# Patient Record
Sex: FEMALE | Race: WHITE | NOT HISPANIC OR LATINO | Employment: UNEMPLOYED | URBAN - METROPOLITAN AREA
[De-identification: names, ages, dates, MRNs, and addresses within clinical notes are randomized per-mention and may not be internally consistent; named-entity substitution may affect disease eponyms.]

---

## 2021-01-14 ENCOUNTER — OFFICE VISIT (OUTPATIENT)
Dept: FAMILY MEDICINE CLINIC | Facility: OTHER | Age: 61
End: 2021-01-14
Payer: COMMERCIAL

## 2021-01-14 VITALS
SYSTOLIC BLOOD PRESSURE: 180 MMHG | OXYGEN SATURATION: 96 % | BODY MASS INDEX: 41.78 KG/M2 | WEIGHT: 260 LBS | RESPIRATION RATE: 22 BRPM | HEART RATE: 98 BPM | DIASTOLIC BLOOD PRESSURE: 110 MMHG | HEIGHT: 66 IN | TEMPERATURE: 98.2 F

## 2021-01-14 DIAGNOSIS — E03.9 HYPOTHYROIDISM, UNSPECIFIED TYPE: ICD-10-CM

## 2021-01-14 DIAGNOSIS — E78.5 HYPERLIPIDEMIA, UNSPECIFIED HYPERLIPIDEMIA TYPE: ICD-10-CM

## 2021-01-14 DIAGNOSIS — M51.26 LUMBAR DISC HERNIATION: ICD-10-CM

## 2021-01-14 DIAGNOSIS — K31.89 STOMACH SPASM: ICD-10-CM

## 2021-01-14 DIAGNOSIS — N18.30 STAGE 3 CHRONIC KIDNEY DISEASE, UNSPECIFIED WHETHER STAGE 3A OR 3B CKD (HCC): ICD-10-CM

## 2021-01-14 DIAGNOSIS — J30.9 ALLERGIC RHINITIS, UNSPECIFIED SEASONALITY, UNSPECIFIED TRIGGER: ICD-10-CM

## 2021-01-14 DIAGNOSIS — Z11.59 NEED FOR HEPATITIS C SCREENING TEST: ICD-10-CM

## 2021-01-14 DIAGNOSIS — J44.9 CHRONIC OBSTRUCTIVE PULMONARY DISEASE, UNSPECIFIED COPD TYPE (HCC): ICD-10-CM

## 2021-01-14 DIAGNOSIS — I10 ESSENTIAL HYPERTENSION: Primary | ICD-10-CM

## 2021-01-14 DIAGNOSIS — F32.A DEPRESSION, UNSPECIFIED DEPRESSION TYPE: ICD-10-CM

## 2021-01-14 DIAGNOSIS — N39.46 MIXED STRESS AND URGE INCONTINENCE: ICD-10-CM

## 2021-01-14 DIAGNOSIS — I51.7 CARDIOMEGALY: ICD-10-CM

## 2021-01-14 DIAGNOSIS — M50.20 CERVICAL DISC HERNIATION: ICD-10-CM

## 2021-01-14 DIAGNOSIS — Z12.31 ENCOUNTER FOR SCREENING MAMMOGRAM FOR BREAST CANCER: ICD-10-CM

## 2021-01-14 DIAGNOSIS — Z11.4 SCREENING FOR HIV (HUMAN IMMUNODEFICIENCY VIRUS): ICD-10-CM

## 2021-01-14 DIAGNOSIS — Z12.11 SCREEN FOR COLON CANCER: ICD-10-CM

## 2021-01-14 PROCEDURE — 4004F PT TOBACCO SCREEN RCVD TLK: CPT | Performed by: FAMILY MEDICINE

## 2021-01-14 PROCEDURE — 99204 OFFICE O/P NEW MOD 45 MIN: CPT | Performed by: FAMILY MEDICINE

## 2021-01-14 PROCEDURE — 3725F SCREEN DEPRESSION PERFORMED: CPT | Performed by: FAMILY MEDICINE

## 2021-01-14 PROCEDURE — 3080F DIAST BP >= 90 MM HG: CPT | Performed by: FAMILY MEDICINE

## 2021-01-14 PROCEDURE — 3008F BODY MASS INDEX DOCD: CPT | Performed by: FAMILY MEDICINE

## 2021-01-14 PROCEDURE — 3077F SYST BP >= 140 MM HG: CPT | Performed by: FAMILY MEDICINE

## 2021-01-14 RX ORDER — MELATONIN
1000 DAILY
Qty: 30 TABLET | Refills: 0 | Status: SHIPPED | OUTPATIENT
Start: 2021-01-14 | End: 2022-04-27

## 2021-01-14 RX ORDER — ACETAMINOPHEN AND CODEINE PHOSPHATE 300; 60 MG/1; MG/1
1 TABLET ORAL EVERY 4 HOURS PRN
COMMUNITY
End: 2021-01-14 | Stop reason: SDUPTHER

## 2021-01-14 RX ORDER — CETIRIZINE HYDROCHLORIDE 10 MG/1
10 TABLET ORAL DAILY
COMMUNITY
End: 2021-01-14 | Stop reason: SDUPTHER

## 2021-01-14 RX ORDER — OXYBUTYNIN CHLORIDE 10 MG/1
10 TABLET, EXTENDED RELEASE ORAL
COMMUNITY
End: 2021-01-14 | Stop reason: SDUPTHER

## 2021-01-14 RX ORDER — BACLOFEN 10 MG/1
10 TABLET ORAL 3 TIMES DAILY
Qty: 90 TABLET | Refills: 0 | Status: SHIPPED | OUTPATIENT
Start: 2021-01-14 | End: 2022-04-27

## 2021-01-14 RX ORDER — ATORVASTATIN CALCIUM 20 MG/1
20 TABLET, FILM COATED ORAL DAILY
Qty: 30 TABLET | Refills: 0 | Status: SHIPPED | OUTPATIENT
Start: 2021-01-14 | End: 2021-09-21 | Stop reason: SDUPTHER

## 2021-01-14 RX ORDER — ALBUTEROL SULFATE 90 UG/1
2 AEROSOL, METERED RESPIRATORY (INHALATION) EVERY 6 HOURS PRN
Qty: 1 INHALER | Refills: 0 | Status: SHIPPED | OUTPATIENT
Start: 2021-01-14 | End: 2021-09-21 | Stop reason: SDUPTHER

## 2021-01-14 RX ORDER — CETIRIZINE HYDROCHLORIDE 10 MG/1
10 TABLET ORAL DAILY
Qty: 30 TABLET | Refills: 0 | Status: SHIPPED | OUTPATIENT
Start: 2021-01-14 | End: 2022-05-16 | Stop reason: SDUPTHER

## 2021-01-14 RX ORDER — BACLOFEN 10 MG/1
10 TABLET ORAL 3 TIMES DAILY
COMMUNITY
End: 2021-01-14 | Stop reason: SDUPTHER

## 2021-01-14 RX ORDER — LISINOPRIL 5 MG/1
5 TABLET ORAL DAILY
Qty: 30 TABLET | Refills: 0 | Status: SHIPPED | OUTPATIENT
Start: 2021-01-14 | End: 2021-09-21 | Stop reason: ALTCHOICE

## 2021-01-14 RX ORDER — CITALOPRAM 40 MG/1
40 TABLET ORAL DAILY
Qty: 30 TABLET | Refills: 0 | Status: SHIPPED | OUTPATIENT
Start: 2021-01-14 | End: 2022-04-27

## 2021-01-14 RX ORDER — GABAPENTIN 300 MG/1
300 CAPSULE ORAL 3 TIMES DAILY
COMMUNITY
End: 2021-01-14 | Stop reason: SDUPTHER

## 2021-01-14 RX ORDER — LEVOTHYROXINE SODIUM 0.2 MG/1
200 TABLET ORAL DAILY
Qty: 30 TABLET | Refills: 0 | Status: SHIPPED | OUTPATIENT
Start: 2021-01-14 | End: 2021-09-21 | Stop reason: SDUPTHER

## 2021-01-14 RX ORDER — NALOXONE HYDROCHLORIDE 4 MG/.1ML
SPRAY NASAL
Qty: 1 EACH | Refills: 1 | Status: SHIPPED | OUTPATIENT
Start: 2021-01-14 | End: 2022-04-27

## 2021-01-14 RX ORDER — MELATONIN
1000 DAILY
COMMUNITY
End: 2021-01-14 | Stop reason: SDUPTHER

## 2021-01-14 RX ORDER — CHLORDIAZEPOXIDE HYDROCHLORIDE AND CLIDINIUM BROMIDE 5; 2.5 MG/1; MG/1
1 CAPSULE ORAL
COMMUNITY
End: 2021-01-14 | Stop reason: SDUPTHER

## 2021-01-14 RX ORDER — LEVOTHYROXINE SODIUM 0.2 MG/1
200 TABLET ORAL DAILY
COMMUNITY
End: 2021-01-14 | Stop reason: SDUPTHER

## 2021-01-14 RX ORDER — ALBUTEROL SULFATE 90 UG/1
2 AEROSOL, METERED RESPIRATORY (INHALATION) EVERY 6 HOURS PRN
COMMUNITY
End: 2021-01-14 | Stop reason: SDUPTHER

## 2021-01-14 RX ORDER — LISINOPRIL 5 MG/1
5 TABLET ORAL DAILY
COMMUNITY
End: 2021-01-14 | Stop reason: SDUPTHER

## 2021-01-14 RX ORDER — CITALOPRAM 40 MG/1
40 TABLET ORAL DAILY
COMMUNITY
End: 2021-01-14 | Stop reason: SDUPTHER

## 2021-01-14 RX ORDER — OXYBUTYNIN CHLORIDE 10 MG/1
10 TABLET, EXTENDED RELEASE ORAL
Qty: 30 TABLET | Refills: 0 | Status: SHIPPED | OUTPATIENT
Start: 2021-01-14 | End: 2022-04-27

## 2021-01-14 RX ORDER — GABAPENTIN 300 MG/1
300 CAPSULE ORAL 3 TIMES DAILY
Qty: 90 CAPSULE | Refills: 0 | Status: SHIPPED | OUTPATIENT
Start: 2021-01-14 | End: 2022-04-27

## 2021-01-14 RX ORDER — CHLORDIAZEPOXIDE HYDROCHLORIDE AND CLIDINIUM BROMIDE 5; 2.5 MG/1; MG/1
1 CAPSULE ORAL
Qty: 120 CAPSULE | Refills: 0 | Status: SHIPPED | OUTPATIENT
Start: 2021-01-14 | End: 2022-04-27

## 2021-01-14 RX ORDER — ATORVASTATIN CALCIUM 20 MG/1
20 TABLET, FILM COATED ORAL DAILY
COMMUNITY
End: 2021-01-14 | Stop reason: SDUPTHER

## 2021-01-14 RX ORDER — ACETAMINOPHEN AND CODEINE PHOSPHATE 300; 60 MG/1; MG/1
1 TABLET ORAL EVERY 4 HOURS PRN
Qty: 90 TABLET | Refills: 0 | Status: SHIPPED | OUTPATIENT
Start: 2021-01-14 | End: 2022-04-27

## 2021-01-21 NOTE — PROGRESS NOTES
Assessment/Plan:    No problem-specific Assessment & Plan notes found for this encounter  Diagnoses and all orders for this visit:    Essential hypertension  -     lisinopril (ZESTRIL) 5 mg tablet; Take 1 tablet (5 mg total) by mouth daily  -     Comprehensive metabolic panel; Future  -     Lipid panel; Future  -     TSH, 3rd generation with Free T4 reflex; Future  -     CBC and differential; Future  -     HEMOGLOBIN A1C W/ EAG ESTIMATION; Future    Hypothyroidism, unspecified type  -     levothyroxine 200 mcg tablet; Take 1 tablet (200 mcg total) by mouth daily  -     TSH, 3rd generation with Free T4 reflex; Future    Hyperlipidemia, unspecified hyperlipidemia type  -     atorvastatin (LIPITOR) 20 mg tablet; Take 1 tablet (20 mg total) by mouth daily  -     Comprehensive metabolic panel; Future  -     Lipid panel; Future    Stage 3 chronic kidney disease, unspecified whether stage 3a or 3b CKD  -     cholecalciferol (VITAMIN D3) 1,000 units tablet; Take 1 tablet (1,000 Units total) by mouth daily  -     Comprehensive metabolic panel; Future    Chronic obstructive pulmonary disease, unspecified COPD type ([facility])  -     albuterol (PROVENTIL HFA,VENTOLIN HFA) 90 mcg/act inhaler; Inhale 2 puffs every 6 (six) hours as needed for wheezing  -     fluticasone-salmeterol (ADVAIR, WIXELA) 250-50 mcg/dose inhaler; Inhale 1 puff 2 (two) times a day Rinse mouth after use  Cardiomegaly    Allergic rhinitis, unspecified seasonality, unspecified trigger  -     cetirizine (ZyrTEC) 10 mg tablet; Take 1 tablet (10 mg total) by mouth daily    Depression, unspecified depression type  -     citalopram (CeleXA) 40 mg tablet; Take 1 tablet (40 mg total) by mouth daily    Mixed stress and urge incontinence  -     oxybutynin (DITROPAN-XL) 10 MG 24 hr tablet; Take 1 tablet (10 mg total) by mouth daily at bedtime    Cervical disc herniation  -     acetaminophen-codeine (TYLENOL with CODEINE #4) 300-60 MG per tablet;  Take 1 tablet by mouth every 4 (four) hours as needed for moderate pain  -     baclofen 10 mg tablet; Take 1 tablet (10 mg total) by mouth 3 (three) times a day  -     gabapentin (NEURONTIN) 300 mg capsule; Take 1 capsule (300 mg total) by mouth 3 (three) times a day  -     naloxone (NARCAN) 4 mg/0 1 mL nasal spray; Administer 1 spray into a nostril  If no response after 2-3 minutes, give another dose in the other nostril using a new spray  Lumbar disc herniation  -     acetaminophen-codeine (TYLENOL with CODEINE #4) 300-60 MG per tablet; Take 1 tablet by mouth every 4 (four) hours as needed for moderate pain  -     baclofen 10 mg tablet; Take 1 tablet (10 mg total) by mouth 3 (three) times a day  -     gabapentin (NEURONTIN) 300 mg capsule; Take 1 capsule (300 mg total) by mouth 3 (three) times a day  -     naloxone (NARCAN) 4 mg/0 1 mL nasal spray; Administer 1 spray into a nostril  If no response after 2-3 minutes, give another dose in the other nostril using a new spray  Stomach spasm  -     chlordiazepoxide-clidinium (LIBRAX) 5-2 5 mg per capsule; Take 1 capsule by mouth 4 (four) times a day (before meals and at bedtime)    BMI 40 0-44 9, adult (HCC)  -     oxybutynin (DITROPAN-XL) 10 MG 24 hr tablet; Take 1 tablet (10 mg total) by mouth daily at bedtime  -     Comprehensive metabolic panel; Future  -     Lipid panel; Future  -     TSH, 3rd generation with Free T4 reflex; Future  -     CBC and differential; Future  -     HEMOGLOBIN A1C W/ EAG ESTIMATION; Future    Need for hepatitis C screening test  -     Hepatitis C antibody; Future    Screening for HIV (human immunodeficiency virus)  -     HIV 1/2 Antigen/Antibody (4th Generation) w Reflex [facility]; Future    Encounter for screening mammogram for breast cancer  -     Mammo screening bilateral w 3d & cad; Future    Screen for colon cancer  -     Occult Blood, Fecal Immunochemical; Future    Other orders  -     Discontinue: atorvastatin (LIPITOR) 20 mg tablet;  Take 20 mg by mouth daily  -     Discontinue: cholecalciferol (VITAMIN D3) 1,000 units tablet; Take 1,000 Units by mouth daily  -     Discontinue: fluticasone-salmeterol (ADVAIR, WIXELA) 250-50 mcg/dose inhaler; Inhale 1 puff 2 (two) times a day Rinse mouth after use  -     Discontinue: baclofen 10 mg tablet; Take 10 mg by mouth 3 (three) times a day  -     Discontinue: cetirizine (ZyrTEC) 10 mg tablet; Take 10 mg by mouth daily  -     Discontinue: oxybutynin (DITROPAN-XL) 10 MG 24 hr tablet; Take 10 mg by mouth daily at bedtime  -     Discontinue: albuterol (PROVENTIL HFA,VENTOLIN HFA) 90 mcg/act inhaler; Inhale 2 puffs every 6 (six) hours as needed for wheezing  -     Discontinue: citalopram (CeleXA) 40 mg tablet; Take 40 mg by mouth daily  -     Discontinue: levothyroxine 200 mcg tablet; Take 200 mcg by mouth daily  -     Discontinue: lisinopril (ZESTRIL) 5 mg tablet; Take 5 mg by mouth daily  -     Discontinue: gabapentin (NEURONTIN) 300 mg capsule; Take 300 mg by mouth 3 (three) times a day  -     Discontinue: acetaminophen-codeine (TYLENOL with CODEINE #4) 300-60 MG per tablet; Take 1 tablet by mouth every 4 (four) hours as needed for moderate pain  -     Discontinue: chlordiazepoxide-clidinium (LIBRAX) 5-2 5 mg per capsule; Take 1 capsule by mouth 4 (four) times a day (before meals and at bedtime)        BMI Counseling: Body mass index is 41 97 kg/m²  The BMI is above normal  Nutrition recommendations include decreasing portion sizes, encouraging healthy choices of fruits and vegetables, decreasing fast food intake, consuming healthier snacks, limiting drinks that contain sugar, moderation in carbohydrate intake, increasing intake of lean protein, reducing intake of saturated and trans fat and reducing intake of cholesterol  Exercise recommendations include moderate physical activity 150 minutes/week  Depression Screening and Follow-up Plan: Patient's depression screening was positive with a PHQ-2 score of 6   Their PHQ-9 score was 24  Patient assessed for underlying major depression  Brief counseling provided and recommend additional follow-up/re-evaluation next office visit  Tobacco Cessation Counseling: Tobacco cessation counseling was not provided  The patient is sincerely urged to quit consumption of tobacco  She is not ready to quit tobacco         62 yo female with complex PMH presents to establish care  Has been off chronic medications for "a while "  Plan to check labs and resume medications ahead of f/u in 4 wks  Will recommend adjustments at that time  Return in about 4 weeks (around 2/11/2021) for Recheck (60 min)  The patient indicates understanding of these issues and agrees with the plan  Subjective:      Patient ID: Anderson Patel is a 63 y o  female  New patient presents to establish care  Complex PMH (see updated problem list)  Has been off all of her medications, lost to follow up    Hypertension  This is a chronic problem  The current episode started more than 1 year ago  The problem has been waxing and waning since onset  The problem is uncontrolled  Pertinent negatives include no anxiety, blurred vision, chest pain, headaches, malaise/fatigue, neck pain, orthopnea, palpitations, peripheral edema, PND, shortness of breath or sweats  There are no associated agents to hypertension  Risk factors for coronary artery disease include sedentary lifestyle, smoking/tobacco exposure, stress, obesity, post-menopausal state, family history and dyslipidemia  Past treatments include ACE inhibitors  The current treatment provides mild improvement  Compliance problems include diet, exercise and psychosocial issues  Hypertensive end-organ damage includes kidney disease  There is no history of angina, CAD/MI, CVA, heart failure, left ventricular hypertrophy, PVD or retinopathy  Identifiable causes of hypertension include chronic renal disease and a thyroid problem   There is no history of a hypertension causing med  Hyperlipidemia  This is a chronic problem  The current episode started more than 1 year ago  Exacerbating diseases include chronic renal disease, hypothyroidism and obesity  She has no history of diabetes, liver disease or nephrotic syndrome  There are no known factors aggravating her hyperlipidemia  Pertinent negatives include no chest pain, focal sensory loss, focal weakness, leg pain, myalgias or shortness of breath  Current antihyperlipidemic treatment includes diet change  Compliance problems include adherence to diet, adherence to exercise and psychosocial issues  Risk factors for coronary artery disease include post-menopausal, a sedentary lifestyle, stress, obesity, hypertension, dyslipidemia and family history  Thyroid Problem  Presents for initial visit  Symptoms include fatigue  Patient reports no anxiety, cold intolerance, constipation, depressed mood, diaphoresis, diarrhea, dry skin, hair loss, heat intolerance, hoarse voice, leg swelling, nail problem, palpitations, tremors, visual change, weight gain or weight loss  The symptoms have been stable  Past treatments include levothyroxine  The treatment provided moderate relief  The following procedures have not been performed: radioiodine uptake scan, thyroid FNA, thyroid ultrasound and thyroidectomy  Her past medical history is significant for hyperlipidemia and obesity  There is no history of atrial fibrillation, dementia, diabetes, Graves' ophthalmopathy, heart failure, neuropathy or osteopenia  There are no known risk factors              The following portions of the patient's history were reviewed and updated as appropriate: allergies, current medications, past family history, past medical history, past social history, past surgical history and problem list       Current Outpatient Medications:     acetaminophen-codeine (TYLENOL with CODEINE #4) 300-60 MG per tablet, Take 1 tablet by mouth every 4 (four) hours as needed for moderate pain, Disp: 90 tablet, Rfl: 0    albuterol (PROVENTIL HFA,VENTOLIN HFA) 90 mcg/act inhaler, Inhale 2 puffs every 6 (six) hours as needed for wheezing, Disp: 1 Inhaler, Rfl: 0    atorvastatin (LIPITOR) 20 mg tablet, Take 1 tablet (20 mg total) by mouth daily, Disp: 30 tablet, Rfl: 0    baclofen 10 mg tablet, Take 1 tablet (10 mg total) by mouth 3 (three) times a day, Disp: 90 tablet, Rfl: 0    cetirizine (ZyrTEC) 10 mg tablet, Take 1 tablet (10 mg total) by mouth daily, Disp: 30 tablet, Rfl: 0    chlordiazepoxide-clidinium (LIBRAX) 5-2 5 mg per capsule, Take 1 capsule by mouth 4 (four) times a day (before meals and at bedtime), Disp: 120 capsule, Rfl: 0    cholecalciferol (VITAMIN D3) 1,000 units tablet, Take 1 tablet (1,000 Units total) by mouth daily, Disp: 30 tablet, Rfl: 0    citalopram (CeleXA) 40 mg tablet, Take 1 tablet (40 mg total) by mouth daily, Disp: 30 tablet, Rfl: 0    fluticasone-salmeterol (ADVAIR, WIXELA) 250-50 mcg/dose inhaler, Inhale 1 puff 2 (two) times a day Rinse mouth after use , Disp: 1 Inhaler, Rfl: 0    gabapentin (NEURONTIN) 300 mg capsule, Take 1 capsule (300 mg total) by mouth 3 (three) times a day, Disp: 90 capsule, Rfl: 0    levothyroxine 200 mcg tablet, Take 1 tablet (200 mcg total) by mouth daily, Disp: 30 tablet, Rfl: 0    lisinopril (ZESTRIL) 5 mg tablet, Take 1 tablet (5 mg total) by mouth daily, Disp: 30 tablet, Rfl: 0    oxybutynin (DITROPAN-XL) 10 MG 24 hr tablet, Take 1 tablet (10 mg total) by mouth daily at bedtime, Disp: 30 tablet, Rfl: 0    naloxone (NARCAN) 4 mg/0 1 mL nasal spray, Administer 1 spray into a nostril  If no response after 2-3 minutes, give another dose in the other nostril using a new spray , Disp: 1 each, Rfl: 1      Review of Systems   Constitutional: Positive for fatigue  Negative for activity change, diaphoresis, fever, malaise/fatigue, weight gain and weight loss     HENT: Negative for congestion, ear pain, hoarse voice, sinus pain and sore throat  Eyes: Negative for blurred vision, pain and itching  Respiratory: Negative for cough and shortness of breath  Cardiovascular: Negative for chest pain, palpitations, orthopnea and PND  Gastrointestinal: Negative for abdominal pain, constipation, diarrhea, nausea and vomiting  Endocrine: Negative for cold intolerance and heat intolerance  Genitourinary: Negative for dysuria  Musculoskeletal: Negative for myalgias and neck pain  Skin: Negative for color change and rash  Neurological: Negative for dizziness, tremors, focal weakness, syncope and headaches  Hematological: Negative for adenopathy  Psychiatric/Behavioral: Negative for behavioral problems, dysphoric mood and sleep disturbance  The patient is not nervous/anxious  Objective:      BP (!) 180/110   Pulse 98   Temp 98 2 °F (36 8 °C)   Resp 22   Ht 5' 6" (1 676 m)   Wt 118 kg (260 lb)   SpO2 96%   BMI 41 97 kg/m²          Physical Exam  Vitals signs and nursing note reviewed  Constitutional:       General: She is not in acute distress  Appearance: She is well-developed  She is obese  HENT:      Head: Normocephalic and atraumatic  Right Ear: External ear normal       Left Ear: External ear normal       Nose: Nose normal    Eyes:      General: No scleral icterus  Conjunctiva/sclera: Conjunctivae normal       Pupils: Pupils are equal, round, and reactive to light  Neck:      Musculoskeletal: Normal range of motion and neck supple  Thyroid: No thyromegaly  Cardiovascular:      Rate and Rhythm: Normal rate and regular rhythm  Heart sounds: Normal heart sounds  No murmur  Pulmonary:      Effort: Pulmonary effort is normal  No respiratory distress  Breath sounds: Normal breath sounds  No wheezing  Abdominal:      General: Bowel sounds are normal  There is no distension  Palpations: Abdomen is soft  Tenderness: There is no abdominal tenderness     Musculoskeletal: Normal range of motion  General: No tenderness  Lymphadenopathy:      Cervical: No cervical adenopathy  Skin:     General: Skin is warm and dry  Coloration: Skin is not jaundiced  Findings: No rash  Neurological:      General: No focal deficit present  Mental Status: She is alert and oriented to person, place, and time  Cranial Nerves: No cranial nerve deficit        Gait: Gait normal    Psychiatric:         Mood and Affect: Mood normal          Behavior: Behavior normal

## 2021-09-21 ENCOUNTER — OFFICE VISIT (OUTPATIENT)
Dept: FAMILY MEDICINE CLINIC | Facility: CLINIC | Age: 61
End: 2021-09-21
Payer: COMMERCIAL

## 2021-09-21 VITALS
WEIGHT: 213.5 LBS | TEMPERATURE: 97.2 F | DIASTOLIC BLOOD PRESSURE: 110 MMHG | SYSTOLIC BLOOD PRESSURE: 240 MMHG | HEIGHT: 66 IN | RESPIRATION RATE: 18 BRPM | OXYGEN SATURATION: 98 % | HEART RATE: 70 BPM | BODY MASS INDEX: 34.31 KG/M2

## 2021-09-21 DIAGNOSIS — Z00.00 ANNUAL PHYSICAL EXAM: Primary | ICD-10-CM

## 2021-09-21 DIAGNOSIS — Z12.31 BREAST CANCER SCREENING BY MAMMOGRAM: ICD-10-CM

## 2021-09-21 DIAGNOSIS — I10 HTN (HYPERTENSION), MALIGNANT: ICD-10-CM

## 2021-09-21 DIAGNOSIS — Z28.21 INFLUENZA VACCINATION DECLINED BY PATIENT: ICD-10-CM

## 2021-09-21 DIAGNOSIS — Z11.4 ENCOUNTER FOR SCREENING FOR HIV: ICD-10-CM

## 2021-09-21 DIAGNOSIS — E03.9 HYPOTHYROIDISM, UNSPECIFIED TYPE: ICD-10-CM

## 2021-09-21 DIAGNOSIS — J44.9 CHRONIC OBSTRUCTIVE PULMONARY DISEASE, UNSPECIFIED COPD TYPE (HCC): ICD-10-CM

## 2021-09-21 DIAGNOSIS — I10 ESSENTIAL HYPERTENSION: ICD-10-CM

## 2021-09-21 DIAGNOSIS — Z12.11 COLON CANCER SCREENING: ICD-10-CM

## 2021-09-21 DIAGNOSIS — Z28.21 COVID-19 VACCINATION DECLINED: ICD-10-CM

## 2021-09-21 DIAGNOSIS — N18.30 STAGE 3 CHRONIC KIDNEY DISEASE, UNSPECIFIED WHETHER STAGE 3A OR 3B CKD (HCC): ICD-10-CM

## 2021-09-21 DIAGNOSIS — E78.5 HYPERLIPIDEMIA, UNSPECIFIED HYPERLIPIDEMIA TYPE: ICD-10-CM

## 2021-09-21 DIAGNOSIS — Z11.59 NEED FOR HEPATITIS C SCREENING TEST: ICD-10-CM

## 2021-09-21 PROBLEM — Z72.0 TOBACCO USE: Status: ACTIVE | Noted: 2021-09-21

## 2021-09-21 PROCEDURE — 3077F SYST BP >= 140 MM HG: CPT | Performed by: INTERNAL MEDICINE

## 2021-09-21 PROCEDURE — 3080F DIAST BP >= 90 MM HG: CPT | Performed by: INTERNAL MEDICINE

## 2021-09-21 PROCEDURE — 3008F BODY MASS INDEX DOCD: CPT | Performed by: INTERNAL MEDICINE

## 2021-09-21 PROCEDURE — 99214 OFFICE O/P EST MOD 30 MIN: CPT | Performed by: INTERNAL MEDICINE

## 2021-09-21 PROCEDURE — 4004F PT TOBACCO SCREEN RCVD TLK: CPT | Performed by: INTERNAL MEDICINE

## 2021-09-21 PROCEDURE — 99396 PREV VISIT EST AGE 40-64: CPT | Performed by: INTERNAL MEDICINE

## 2021-09-21 RX ORDER — BLOOD PRESSURE TEST KIT
KIT MISCELLANEOUS
Qty: 1 KIT | Refills: 0 | Status: SHIPPED | OUTPATIENT
Start: 2021-09-21 | End: 2021-10-22

## 2021-09-21 RX ORDER — ALBUTEROL SULFATE 90 UG/1
2 AEROSOL, METERED RESPIRATORY (INHALATION) EVERY 6 HOURS PRN
Qty: 18 G | Refills: 5 | OUTPATIENT
Start: 2021-09-21 | End: 2022-04-27

## 2021-09-21 RX ORDER — LEVOTHYROXINE SODIUM 0.2 MG/1
200 TABLET ORAL DAILY
Qty: 90 TABLET | Refills: 1 | Status: SHIPPED | OUTPATIENT
Start: 2021-09-21 | End: 2022-01-20

## 2021-09-21 RX ORDER — ATORVASTATIN CALCIUM 20 MG/1
20 TABLET, FILM COATED ORAL DAILY
Qty: 90 TABLET | Refills: 1 | Status: SHIPPED | OUTPATIENT
Start: 2021-09-21 | End: 2022-01-20

## 2021-09-21 RX ORDER — LISINOPRIL AND HYDROCHLOROTHIAZIDE 12.5; 1 MG/1; MG/1
1 TABLET ORAL DAILY
Qty: 30 TABLET | Refills: 5 | Status: SHIPPED | OUTPATIENT
Start: 2021-09-21 | End: 2022-01-20

## 2021-09-21 NOTE — ASSESSMENT & PLAN NOTE
Lab Results   Component Value Date    CREATININE 1 33 (H) 03/25/2014   Blood pressure not at well controlled for CKD-will recheck renal function and try to get bp under control

## 2021-09-21 NOTE — ASSESSMENT & PLAN NOTE
Blood pressure is extremely high today and she says she has not been taking her lisinopril-she says she has had bps in the past that were low but it's hard to imagine when they are as high as they are today-it's possible that she has PAD with hard arteries as well-I will start a combination product like prinzide and check renal US with RA dopplers as well as Vascular dopplers of her lower extremities-will rx a blood pressure machine as well-advised on low sodium diet, daily exercise, avoidance of alcohol

## 2021-09-21 NOTE — PROGRESS NOTES
[address] [facility] [address] MEDICINE    NAME: Tere Thomas  AGE: 63 y o   SEX: female  : 1960     DATE: 2021     Assessment and Plan:     Problem List Items Addressed This Visit        Endocrine    Hypothyroidism     Has not been on her thyroid medication in awhile-check TSH and fill levothyroxine, may have to adjust the dose            Respiratory    Chronic obstructive pulmonary disease ([facility] )     I believe long standing-she is a heavy smoker, smoking at least 1 1/2 ppd-does NOT desire to quit-she also declines the LDCT screening for lung cancer-she likes her regimen of albuterol and advair so will continue these and refill them-should try to get PFTs at some point            Cardiovascular and Mediastinum    Essential hypertension     Blood pressure is extremely high today and she says she has not been taking her lisinopril-she says she has had bps in the past that were low but it's hard to imagine when they are as high as they are today-it's possible that she has PAD with hard arteries as well-I will start a combination product like prinzide and check renal US with RA dopplers as well as Vascular dopplers of her lower extremities-will rx a blood pressure machine as well-advised on low sodium diet, daily exercise, avoidance of alcohol         Relevant Medications    lisinopril-hydrochlorothiazide (PRINZIDE,ZESTORETIC) 10-12 5 MG per tablet       Genitourinary    Stage 3 chronic kidney disease ([facility] )     Lab Results   Component Value Date    CREATININE 1 33 (H) 2014   Blood pressure not at well controlled for CKD-will recheck renal function and try to get bp under control           Other Visit Diagnoses     Annual physical exam    -  Primary    Relevant Orders    CBC and differential    Comprehensive metabolic panel    Lipid panel    TSH, 3rd generation    Hepatitis C antibody    HIV 1/2 Antigen/Antibody (4th Generation) w Reflex [facility] Encounter for screening for HIV        Relevant Orders    HIV 1/2 Antigen/Antibody (4th Generation) w Reflex [facility]    Need for hepatitis C screening test        Relevant Orders    Hepatitis C antibody    Breast cancer screening by mammogram        Relevant Orders    Mammo screening bilateral w 3d & cad    BMI 34 0-34 9,adult        Relevant Orders    CBC and differential    Comprehensive metabolic panel    Lipid panel    TSH, 3rd generation    Colon cancer screening        Relevant Orders    Cologuard    Influenza vaccination declined by patient        COVID-19 vaccination declined        HTN (hypertension), malignant        Relevant Medications    Blood Pressure Monitor KIT    lisinopril-hydrochlorothiazide (PRINZIDE,ZESTORETIC) 10-12 5 MG per tablet    Other Relevant Orders    VAS renal artery complete    VAS lower limb arterial duplex, complete bilateral          Immunizations and preventive care screenings were discussed with patient today  Appropriate education was printed on patient's after visit summary  Counseling:  Alcohol/drug use: discussed moderation in alcohol intake, the recommendations for healthy alcohol use, and avoidance of illicit drug use  · Exercise: the importance of regular exercise/physical activity was discussed  Recommend exercise 3-5 times per week for at least 30 minutes  Return in about 6 weeks (around 11/2/2021) for Next scheduled follow up  Chief Complaint:     Chief Complaint   Patient presents with    New Patient Visit    Physical Exam      History of Present Illness:     Adult Annual Physical   Patient here for a comprehensive physical exam  The patient reports problems - COPD, HTN, tobacco use, HL  Diet and Physical Activity  · Diet/Nutrition: well balanced diet  · Exercise: no formal exercise        Depression Screening  PHQ-9 Depression Screening    PHQ-9:   Frequency of the following problems over the past two weeks:           General Health  · Sleep: sleeps well  · Hearing: normal - bilateral   · Vision: no vision problems  · Dental: regular dental visits  /GYN Health  · Patient is: not discussed  · Last menstrual period: not discussed  · Contraceptive method: not discussed       Review of Systems:     Review of Systems   Past Medical History:     Past Medical History:   Diagnosis Date    Allergic     Allergy to sulfa drugs     Anxiety     Chronic back pain     [facility] obstructive pulmonary disease) ([facility])     Depression     Enlarged heart     GERD (gastroesophageal reflux disease)     Headache     Heart disease     Heart murmur     Heart valve disorder     HTN (hypertension)     Hyperglycemia     Hyperlipidemia     Hypothyroidism     Insomnia     Irritable bowel syndrome with diarrhea     Kidney stones     Migraines     MVP (mitral valve prolapse)     Myxomatous mitral valve     mild lvh ef 60%    Seasonal allergies     Sickle cell trait ([facility])     Stage 3b chronic kidney disease ([facility])     Thalassemia carrier     Tobacco user     Urinary incontinence     Vitamin D deficiency       Past Surgical History:     Past Surgical History:   Procedure Laterality Date    ALVEOPLASTY  09/15/2016    with extraction     SECTION      CHOLECYSTECTOMY      TONSILLECTOMY      age 13      Social History:     Social History     Socioeconomic History    Marital status: Legally      Spouse name: None    Number of children: None    Years of education: None    Highest education level: 12th grade   Occupational History    None   Tobacco Use    Smoking status: Current Every Day Smoker     Packs/day: 1 00     Types: Cigarettes    Smokeless tobacco: Never Used   Vaping Use    Vaping Use: Never used   Substance and Sexual Activity    Alcohol use: Yes     Comment: social    Drug use: Never     Comment: Denies-Per [place]     Sexual activity: None   Other Topics Concern    None   Social History Narrative Most recent tobacco use screenin2019      Do you currently or have you served in the [employer]:   No      Were you activated, into active duty, as a member of the [employer] or as a Reservist:   No      Exercise level:   None      Diet:   Regular      Has smoked since age:   14      Caffeine intake:   Heavy      Guns present in home:   No      Seat belts used routinely:   Yes      Sunscreen used routinely:   No      Smoke alarm in home:   Yes      Advance directive:   No      Live alone or with others:   alone      Are there stairs in your home:   Yes      International travel:   denies      Pets:   No      Deaf or serious difficulty hearing:   No      Blind or serious difficulty seeing:   No      Difficulty concentrating, remembering or making decisions:   Yes      Difficulty walking or climbing stairs:   No      Difficulty dressing or bathing:   No      Difficulty doing errands alone:   No      Social Determinants of Health     Financial Resource Strain:     Difficulty of Paying Living Expenses:    Food Insecurity:     Worried About Running Out of Food in the Last Year:     [address] in the Last Year:    Transportation Needs:     Lack of Transportation (Medical):      Lack of Transportation (Non-Medical):    Physical Activity:     Days of Exercise per Week:     Minutes of Exercise per Session:    Stress:     Feeling of Stress :    Social Connections:     Frequency of Communication with Friends and Family:     Frequency of Social Gatherings with Friends and Family:     Attends Shinto Services:     Active Member of Clubs or Organizations:     Attends Club or Organization Meetings:     Marital Status:    Intimate Partner Violence:     Fear of Current or Ex-Partner:     Emotionally Abused:     Physically Abused:     Sexually Abused:       Family History:     Family History   Problem Relation Age of Onset   [employer] Brain cancer Mother     Lung cancer Mother     Kidney cancer Mother     Kidney disease Mother     Other Mother         4 PPD Heavy Smoker    Stroke Father         at 50    Heart disease Father     Heart attack Father         x 10    Other Father         4 PPD Heavy Smoker    Sickle cell trait Sister     Kidney disease Sister     Heart attack Son         Iva Hashimoto @ age 44    Heart disease Maternal Grandmother       Current Medications:     Current Outpatient Medications   Medication Sig Dispense Refill    acetaminophen-codeine (TYLENOL with CODEINE #4) 300-60 MG per tablet Take 1 tablet by mouth every 4 (four) hours as needed for moderate pain 90 tablet 0    baclofen 10 mg tablet Take 1 tablet (10 mg total) by mouth 3 (three) times a day 90 tablet 0    cetirizine (ZyrTEC) 10 mg tablet Take 1 tablet (10 mg total) by mouth daily 30 tablet 0    chlordiazepoxide-clidinium (LIBRAX) 5-2 5 mg per capsule Take 1 capsule by mouth 4 (four) times a day (before meals and at bedtime) 120 capsule 0    cholecalciferol (VITAMIN D3) 1,000 units tablet Take 1 tablet (1,000 Units total) by mouth daily 30 tablet 0    gabapentin (NEURONTIN) 300 mg capsule Take 1 capsule (300 mg total) by mouth 3 (three) times a day 90 capsule 0    naloxone (NARCAN) 4 mg/0 1 mL nasal spray Administer 1 spray into a nostril  If no response after 2-3 minutes, give another dose in the other nostril using a new spray   1 each 1    oxybutynin (DITROPAN-XL) 10 MG 24 hr tablet Take 1 tablet (10 mg total) by mouth daily at bedtime 30 tablet 0    albuterol (PROVENTIL HFA,VENTOLIN HFA) 90 mcg/act inhaler Inhale 2 puffs every 6 (six) hours as needed for wheezing 18 g 5    atorvastatin (LIPITOR) 20 mg tablet Take 1 tablet (20 mg total) by mouth daily 90 tablet 1    Blood Pressure Monitor KIT USE DAILY TO CHECK BLOOD PRESSURE 1 kit 0    citalopram (CeleXA) 40 mg tablet Take 1 tablet (40 mg total) by mouth daily (Patient not taking: Reported on 9/21/2021) 30 tablet 0    fluticasone-salmeterol (Advair) 250-50 mcg/dose inhaler Inhale 1 puff 2 (two) times a day Rinse mouth after use  60 blister 5    levothyroxine 200 mcg tablet Take 1 tablet (200 mcg total) by mouth daily 90 tablet 1    lisinopril-hydrochlorothiazide (PRINZIDE,ZESTORETIC) 10-12 5 MG per tablet Take 1 tablet by mouth daily 30 tablet 5     No current facility-administered medications for this visit  Allergies: Allergies   Allergen Reactions    Clindamycin Hives    Sulfa Antibiotics Itching      Physical Exam:     BP (!) 240/110   Pulse 70   Temp (!) 97 2 °F (36 2 °C) (Temporal)   Resp 18   Ht 5' 6" (1 676 m)   Wt 96 8 kg (213 lb 8 oz)   SpO2 98%   BMI 34 46 kg/m²     Physical Exam     Shorty England MD  [facility]     BMI Counseling: Body mass index is 34 46 kg/m²  The BMI is above normal  Nutrition recommendations include reducing portion sizes, decreasing overall calorie intake, 3-5 servings of fruits/vegetables daily, reducing fast food intake, consuming healthier snacks, decreasing soda and/or juice intake, moderation in carbohydrate intake, increasing intake of lean protein, reducing intake of saturated fat and trans fat and reducing intake of cholesterol  Exercise recommendations include strength training exercises

## 2021-09-21 NOTE — ASSESSMENT & PLAN NOTE
Has not been on her thyroid medication in awhile-check TSH and fill levothyroxine, may have to adjust the dose

## 2021-09-21 NOTE — ASSESSMENT & PLAN NOTE
I believe long standing-she is a heavy smoker, smoking at least 1 1/2 ppd-does NOT desire to quit-she also declines the LDCT screening for lung cancer-she likes her regimen of albuterol and advair so will continue these and refill them-should try to get PFTs at some point

## 2021-09-21 NOTE — PATIENT INSTRUCTIONS

## 2021-09-29 ENCOUNTER — LAB (OUTPATIENT)
Dept: LAB | Facility: HOSPITAL | Age: 61
End: 2021-09-29
Attending: INTERNAL MEDICINE
Payer: COMMERCIAL

## 2021-09-29 DIAGNOSIS — Z11.4 ENCOUNTER FOR SCREENING FOR HIV: ICD-10-CM

## 2021-09-29 DIAGNOSIS — Z00.00 ANNUAL PHYSICAL EXAM: ICD-10-CM

## 2021-09-29 DIAGNOSIS — Z11.59 NEED FOR HEPATITIS C SCREENING TEST: ICD-10-CM

## 2021-09-29 LAB
ALBUMIN SERPL BCP-MCNC: 4.3 G/DL (ref 3.4–4.8)
ALP SERPL-CCNC: 64.2 U/L (ref 35–140)
ALT SERPL W P-5'-P-CCNC: 5 U/L (ref 5–54)
ANION GAP SERPL CALCULATED.3IONS-SCNC: 9 MMOL/L (ref 4–13)
AST SERPL W P-5'-P-CCNC: 9 U/L (ref 15–41)
BASOPHILS # BLD AUTO: 0.07 THOUSANDS/ΜL (ref 0–0.1)
BASOPHILS NFR BLD AUTO: 1 % (ref 0–1)
BILIRUB SERPL-MCNC: 0.26 MG/DL (ref 0.3–1.2)
BUN SERPL-MCNC: 12 MG/DL (ref 6–20)
CALCIUM SERPL-MCNC: 9.4 MG/DL (ref 8.4–10.2)
CHLORIDE SERPL-SCNC: 106 MMOL/L (ref 96–108)
CHOLEST SERPL-MCNC: 275 MG/DL
CO2 SERPL-SCNC: 23 MMOL/L (ref 22–33)
CREAT SERPL-MCNC: 1.48 MG/DL (ref 0.4–1.1)
EOSINOPHIL # BLD AUTO: 0.14 THOUSAND/ΜL (ref 0–0.61)
EOSINOPHIL NFR BLD AUTO: 2 % (ref 0–6)
ERYTHROCYTE [DISTWIDTH] IN BLOOD BY AUTOMATED COUNT: 13.4 % (ref 11.6–15.1)
GFR SERPL CREATININE-BSD FRML MDRD: 38 ML/MIN/1.73SQ M
GLUCOSE P FAST SERPL-MCNC: 101 MG/DL (ref 70–105)
HCT VFR BLD AUTO: 43.8 % (ref 34.8–46.1)
HDLC SERPL-MCNC: 46 MG/DL
HGB BLD-MCNC: 15.1 G/DL (ref 11.5–15.4)
IMM GRANULOCYTES # BLD AUTO: 0.02 THOUSAND/UL (ref 0–0.2)
IMM GRANULOCYTES NFR BLD AUTO: 0 % (ref 0–2)
LDLC SERPL CALC-MCNC: 185 MG/DL (ref 0–100)
LYMPHOCYTES # BLD AUTO: 2.78 THOUSANDS/ΜL (ref 0.6–4.47)
LYMPHOCYTES NFR BLD AUTO: 34 % (ref 14–44)
MCH RBC QN AUTO: 30.9 PG (ref 26.8–34.3)
MCHC RBC AUTO-ENTMCNC: 34.5 G/DL (ref 31.4–37.4)
MCV RBC AUTO: 90 FL (ref 82–98)
MONOCYTES # BLD AUTO: 0.51 THOUSAND/ΜL (ref 0.17–1.22)
MONOCYTES NFR BLD AUTO: 6 % (ref 4–12)
NEUTROPHILS # BLD AUTO: 4.67 THOUSANDS/ΜL (ref 1.85–7.62)
NEUTS SEG NFR BLD AUTO: 57 % (ref 43–75)
NONHDLC SERPL-MCNC: 229 MG/DL
PLATELET # BLD AUTO: 299 THOUSANDS/UL (ref 149–390)
PMV BLD AUTO: 9.3 FL (ref 8.9–12.7)
POTASSIUM SERPL-SCNC: 4.1 MMOL/L (ref 3.5–5)
PROT SERPL-MCNC: 7.3 G/DL (ref 6.4–8.3)
RBC # BLD AUTO: 4.89 MILLION/UL (ref 3.81–5.12)
SODIUM SERPL-SCNC: 138 MMOL/L (ref 133–145)
TRIGL SERPL-MCNC: 221.6 MG/DL
TSH SERPL DL<=0.05 MIU/L-ACNC: 37.33 UIU/ML (ref 0.34–5.6)
WBC # BLD AUTO: 8.19 THOUSAND/UL (ref 4.31–10.16)

## 2021-09-29 PROCEDURE — 36415 COLL VENOUS BLD VENIPUNCTURE: CPT

## 2021-09-29 PROCEDURE — 84443 ASSAY THYROID STIM HORMONE: CPT

## 2021-09-29 PROCEDURE — 87389 HIV-1 AG W/HIV-1&-2 AB AG IA: CPT

## 2021-09-29 PROCEDURE — 85025 COMPLETE CBC W/AUTO DIFF WBC: CPT

## 2021-09-29 PROCEDURE — 86803 HEPATITIS C AB TEST: CPT

## 2021-09-29 PROCEDURE — 80061 LIPID PANEL: CPT

## 2021-09-29 PROCEDURE — 80053 COMPREHEN METABOLIC PANEL: CPT

## 2021-09-30 LAB — HCV AB SER QL: NORMAL

## 2021-10-01 LAB — HIV 1+2 AB+HIV1 P24 AG SERPL QL IA: NORMAL

## 2021-10-14 ENCOUNTER — TELEPHONE (OUTPATIENT)
Dept: FAMILY MEDICINE CLINIC | Facility: CLINIC | Age: 61
End: 2021-10-14

## 2021-10-22 ENCOUNTER — DOCUMENTATION (OUTPATIENT)
Dept: FAMILY MEDICINE CLINIC | Facility: CLINIC | Age: 61
End: 2021-10-22

## 2021-10-22 RX ORDER — BLOOD PRESSURE TEST KIT-LARGE
KIT MISCELLANEOUS
COMMUNITY
Start: 2021-09-22 | End: 2022-04-27

## 2022-03-09 ENCOUNTER — TELEPHONE (OUTPATIENT)
Dept: FAMILY MEDICINE CLINIC | Facility: CLINIC | Age: 62
End: 2022-03-09

## 2022-03-09 DIAGNOSIS — E03.9 HYPOTHYROIDISM, UNSPECIFIED TYPE: Primary | ICD-10-CM

## 2022-03-09 DIAGNOSIS — I10 ESSENTIAL HYPERTENSION: ICD-10-CM

## 2022-03-09 DIAGNOSIS — E78.5 HYPERLIPIDEMIA, UNSPECIFIED HYPERLIPIDEMIA TYPE: ICD-10-CM

## 2022-03-09 NOTE — TELEPHONE ENCOUNTER
Patient has an appointment at the end of the month and wants to get her bloodwork done prior to that visit

## 2022-04-27 ENCOUNTER — APPOINTMENT (EMERGENCY)
Dept: RADIOLOGY | Facility: HOSPITAL | Age: 62
End: 2022-04-27
Payer: MEDICARE

## 2022-04-27 ENCOUNTER — HOSPITAL ENCOUNTER (EMERGENCY)
Facility: HOSPITAL | Age: 62
Discharge: HOME | End: 2022-04-27
Attending: EMERGENCY MEDICINE
Payer: MEDICARE

## 2022-04-27 VITALS
DIASTOLIC BLOOD PRESSURE: 76 MMHG | TEMPERATURE: 97.7 F | HEIGHT: 63 IN | OXYGEN SATURATION: 98 % | RESPIRATION RATE: 24 BRPM | BODY MASS INDEX: 38.43 KG/M2 | HEART RATE: 105 BPM | SYSTOLIC BLOOD PRESSURE: 137 MMHG

## 2022-04-27 DIAGNOSIS — J44.9 CHRONIC OBSTRUCTIVE PULMONARY DISEASE, UNSPECIFIED COPD TYPE (HCC): ICD-10-CM

## 2022-04-27 DIAGNOSIS — J44.1 COPD WITH ACUTE EXACERBATION (HCC): Primary | ICD-10-CM

## 2022-04-27 DIAGNOSIS — J10.1 INFLUENZA A: ICD-10-CM

## 2022-04-27 LAB
FLUAV RNA RESP QL NAA+PROBE: POSITIVE
FLUBV RNA RESP QL NAA+PROBE: NEGATIVE
RSV RNA RESP QL NAA+PROBE: NEGATIVE
SARS-COV-2 RNA RESP QL NAA+PROBE: NEGATIVE

## 2022-04-27 PROCEDURE — 71045 X-RAY EXAM CHEST 1 VIEW: CPT

## 2022-04-27 PROCEDURE — 99285 EMERGENCY DEPT VISIT HI MDM: CPT

## 2022-04-27 PROCEDURE — 99285 EMERGENCY DEPT VISIT HI MDM: CPT | Performed by: EMERGENCY MEDICINE

## 2022-04-27 PROCEDURE — 0241U HB NFCT DS VIR RESP RNA 4 TRGT: CPT | Performed by: EMERGENCY MEDICINE

## 2022-04-27 PROCEDURE — 94640 AIRWAY INHALATION TREATMENT: CPT

## 2022-04-27 RX ORDER — ALBUTEROL SULFATE 90 UG/1
2 AEROSOL, METERED RESPIRATORY (INHALATION) EVERY 4 HOURS PRN
Qty: 18 G | Refills: 0 | Status: SHIPPED | OUTPATIENT
Start: 2022-04-27 | End: 2022-05-16 | Stop reason: SDUPTHER

## 2022-04-27 RX ORDER — AZITHROMYCIN 250 MG/1
TABLET, FILM COATED ORAL
Qty: 6 TABLET | Refills: 0 | Status: SHIPPED | OUTPATIENT
Start: 2022-04-27 | End: 2022-05-01

## 2022-04-27 RX ORDER — ALBUTEROL SULFATE 90 UG/1
2 AEROSOL, METERED RESPIRATORY (INHALATION) EVERY 4 HOURS PRN
Qty: 18 G | Refills: 0 | Status: SHIPPED | OUTPATIENT
Start: 2022-04-27 | End: 2022-04-27 | Stop reason: SDUPTHER

## 2022-04-27 RX ORDER — PREDNISONE 20 MG/1
60 TABLET ORAL ONCE
Status: COMPLETED | OUTPATIENT
Start: 2022-04-27 | End: 2022-04-27

## 2022-04-27 RX ORDER — AZITHROMYCIN 250 MG/1
TABLET, FILM COATED ORAL
Qty: 6 TABLET | Refills: 0 | Status: SHIPPED | OUTPATIENT
Start: 2022-04-27 | End: 2022-04-27 | Stop reason: SDUPTHER

## 2022-04-27 RX ORDER — PREDNISONE 20 MG/1
40 TABLET ORAL DAILY
Qty: 8 TABLET | Refills: 0 | Status: SHIPPED | OUTPATIENT
Start: 2022-04-27 | End: 2022-05-01

## 2022-04-27 RX ORDER — ALBUTEROL SULFATE 2.5 MG/3ML
5 SOLUTION RESPIRATORY (INHALATION) ONCE
Status: COMPLETED | OUTPATIENT
Start: 2022-04-27 | End: 2022-04-27

## 2022-04-27 RX ORDER — PREDNISONE 20 MG/1
40 TABLET ORAL DAILY
Qty: 8 TABLET | Refills: 0 | Status: SHIPPED | OUTPATIENT
Start: 2022-04-27 | End: 2022-04-27 | Stop reason: SDUPTHER

## 2022-04-27 RX ADMIN — ALBUTEROL SULFATE 5 MG: 2.5 SOLUTION RESPIRATORY (INHALATION) at 17:06

## 2022-04-27 RX ADMIN — IPRATROPIUM BROMIDE 0.5 MG: 0.5 SOLUTION RESPIRATORY (INHALATION) at 17:06

## 2022-04-27 RX ADMIN — PREDNISONE 60 MG: 20 TABLET ORAL at 17:06

## 2022-05-02 ENCOUNTER — TELEPHONE (OUTPATIENT)
Dept: FAMILY MEDICINE CLINIC | Facility: CLINIC | Age: 62
End: 2022-05-02

## 2022-05-02 NOTE — TELEPHONE ENCOUNTER
Should be followed up -she can use mucinex or mucinex dm over the counter until one of us can see her and re-evaluate her

## 2022-05-02 NOTE — TELEPHONE ENCOUNTER
Patient advise that she was in the ER last Thursday  She was released with Albuterol, Azithromycin and Prednisone  She is requesting for Dr Latasha Lopes to prescribe a cough suppressant w/ Codeine due to bad cough  She was also instructed to f/u with Dr Latasha Lopes after ER visit

## 2022-05-14 NOTE — ED PROVIDER NOTES
History  Chief Complaint   Patient presents with    Shortness of Breath     pt presents to ed via walk in with SOB x 1 week      Patient with approximately one week of URI symptoms  Presents with some sob  No fevers  Has had cough and some congestion  Multiple family members also ill as well  No radiation of symptoms  Moderate in severity  Worse with exertion  No other associated symptoms  Did not have covid or flu vaccine  No associated chest pain  Prior to Admission Medications   Prescriptions Last Dose Informant Patient Reported? Taking? albuterol (PROVENTIL HFA,VENTOLIN HFA) 90 mcg/act inhaler 4/27/2022 at Unknown time  No Yes   Sig: Inhale 2 puffs every 6 (six) hours as needed for wheezing   albuterol (PROVENTIL HFA,VENTOLIN HFA) 90 mcg/act inhaler   No No   Sig: Inhale 2 puffs every 4 (four) hours as needed for wheezing or shortness of breath   atorvastatin (LIPITOR) 20 mg tablet 4/27/2022 at Unknown time  No Yes   Sig: TAKE 1 TABLET (20 MG TOTAL) BY MOUTH DAILY   cetirizine (ZyrTEC) 10 mg tablet   No No   Sig: Take 1 tablet (10 mg total) by mouth daily   fluticasone-salmeterol (Advair) 250-50 mcg/dose inhaler 4/27/2022 at Unknown time  No Yes   Sig: Inhale 1 puff 2 (two) times a day Rinse mouth after use     levothyroxine 200 mcg tablet 4/27/2022 at Unknown time  No Yes   Sig: TAKE 1 TABLET (200 MCG TOTAL) BY MOUTH DAILY   lisinopril-hydrochlorothiazide (PRINZIDE,ZESTORETIC) 10-12 5 MG per tablet 4/27/2022 at Unknown time  No Yes   Sig: TAKE 1 TABLET BY MOUTH DAILY      Facility-Administered Medications: None       Past Medical History:   Diagnosis Date    Allergic     Allergy to sulfa drugs     Anxiety     Chronic back pain     COPD (chronic obstructive pulmonary disease) (HCC)     Depression     Enlarged heart     GERD (gastroesophageal reflux disease)     Headache     Heart disease     Heart murmur     Heart valve disorder     HTN (hypertension)     Hyperglycemia     Hyperlipidemia     Hypothyroidism     Insomnia     Irritable bowel syndrome with diarrhea     Kidney stones     Migraines     MVP (mitral valve prolapse)     Myxomatous mitral valve     mild lvh ef 60%    Seasonal allergies     Sickle cell trait (HCC)     Stage 3b chronic kidney disease (HCC)     Thalassemia carrier     Tobacco user     Urinary incontinence     Vitamin D deficiency        Past Surgical History:   Procedure Laterality Date    ALVEOPLASTY  09/15/2016    with extraction     SECTION      CHOLECYSTECTOMY      TONSILLECTOMY      age 13       Family History   Problem Relation Age of Onset    Brain cancer Mother     Lung cancer Mother     Kidney cancer Mother     Kidney disease Mother     Other Mother         4 PPD Heavy Smoker    Stroke Father         at 50    Heart disease Father     Heart attack Father         x 10    Other Father         4 PPD Heavy Smoker    Sickle cell trait Sister     Kidney disease Sister     Heart attack Son         Laura Rooney @ age 44    Heart disease Maternal Grandmother      I have reviewed and agree with the history as documented  E-Cigarette/Vaping    E-Cigarette Use Never User      E-Cigarette/Vaping Substances    Nicotine No     THC No     CBD No     Flavoring No     Other No     Unknown No      Social History     Tobacco Use    Smoking status: Current Every Day Smoker     Packs/day: 1 00     Types: Cigarettes    Smokeless tobacco: Never Used   Vaping Use    Vaping Use: Never used   Substance Use Topics    Alcohol use: Yes     Comment: social    Drug use: Never     Comment: Denies-Per Yadira        Review of Systems   Constitutional: Negative for chills and fever  HENT: Positive for congestion and rhinorrhea  Negative for ear discharge, ear pain, facial swelling, mouth sores, sore throat and trouble swallowing  Eyes: Negative for redness and itching     Respiratory: Positive for cough, shortness of breath and wheezing  Negative for chest tightness  Cardiovascular: Negative for chest pain and palpitations  Gastrointestinal: Negative for abdominal pain, nausea and vomiting  Musculoskeletal: Negative for neck pain and neck stiffness  Skin: Negative for pallor and rash  Neurological: Negative for weakness, light-headedness and numbness  All other systems reviewed and are negative  Physical Exam  Physical Exam  Vitals and nursing note reviewed  Constitutional:       Appearance: She is well-developed  HENT:      Head: Normocephalic and atraumatic  Right Ear: External ear normal       Left Ear: External ear normal       Mouth/Throat:      Pharynx: No oropharyngeal exudate  Eyes:      General:         Right eye: No discharge  Left eye: No discharge  Pupils: Pupils are equal, round, and reactive to light  Cardiovascular:      Rate and Rhythm: Normal rate and regular rhythm  Heart sounds: Normal heart sounds  Pulmonary:      Effort: Pulmonary effort is normal  Tachypnea present  No respiratory distress  Breath sounds: No stridor  Examination of the right-upper field reveals wheezing  Examination of the left-upper field reveals wheezing  Examination of the right-middle field reveals wheezing  Examination of the left-middle field reveals wheezing  Examination of the right-lower field reveals wheezing  Examination of the left-lower field reveals wheezing  Wheezing present  No decreased breath sounds  Abdominal:      General: There is no distension  Palpations: Abdomen is soft  Tenderness: There is no abdominal tenderness  Musculoskeletal:      Cervical back: Normal range of motion and neck supple  Lymphadenopathy:      Cervical: No cervical adenopathy  Skin:     General: Skin is warm and dry  Findings: No erythema or rash  Neurological:      Mental Status: She is alert and oriented to person, place, and time     Psychiatric:         Behavior: Behavior normal          Vital Signs  ED Triage Vitals [04/27/22 1655]   Temperature Pulse Respirations Blood Pressure SpO2   97 7 °F (36 5 °C) 105 (!) 24 137/76 98 %      Temp Source Heart Rate Source Patient Position - Orthostatic VS BP Location FiO2 (%)   Oral Monitor Sitting Left arm --      Pain Score       No Pain           Vitals:    04/27/22 1655   BP: 137/76   Pulse: 105   Patient Position - Orthostatic VS: Sitting         Visual Acuity      ED Medications  Medications   albuterol inhalation solution 5 mg (5 mg Nebulization Given 4/27/22 1706)   ipratropium (ATROVENT) 0 02 % inhalation solution 0 5 mg (0 5 mg Nebulization Given 4/27/22 1706)   predniSONE tablet 60 mg (60 mg Oral Given 4/27/22 1706)       Diagnostic Studies  Results Reviewed     Procedure Component Value Units Date/Time    COVID/FLU/RSV - 2 hour TAT [[id number]]  (Abnormal) Collected: 04/27/22 1704    Lab Status: Final result Specimen: Nares from Nose Updated: 04/27/22 1811     SARS-CoV-2 Negative     INFLUENZA A PCR Positive     INFLUENZA B PCR Negative     RSV PCR Negative    Narrative:      FOR PEDIATRIC PATIENTS - copy/paste COVID Guidelines URL to browser: [email or web address]  [employer]    SARS-CoV-2 assay is a Nucleic Acid Amplification assay intended for the  qualitative detection of nucleic acid from SARS-CoV-2 in nasopharyngeal  swabs  Results are for the presumptive identification of SARS-CoV-2 RNA  Positive results are indicative of infection with SARS-CoV-2, the virus  causing COVID-19, but do not rule out bacterial infection or co-infection  with other viruses  Laboratories within the [place] and its  territories are required to report all positive results to the appropriate  public health authorities  Negative results do not preclude SARS-CoV-2  infection and should not be used as the sole basis for treatment or other  patient management decisions   Negative results must be combined with  clinical observations, patient history, and epidemiological information  This test has not been FDA cleared or approved  This test has been authorized by FDA under an Emergency Use Authorization  (EUA)  This test is only authorized for the duration of time the  declaration that circumstances exist justifying the authorization of the  emergency use of an in vitro diagnostic tests for detection of SARS-CoV-2  virus and/or diagnosis of COVID-19 infection under section 564(b)(1) of  the Act, 21 U  S C  346XTE-0(B)(4), unless the authorization is terminated  or revoked sooner  The test has been validated but independent review by FDA  and CLIA is pending  Test performed using [employer] Gene[employer]pert: This RT-PCR assay targets N2,  a region unique to SARS-CoV-2  A conserved region in the E-gene was chosen  for pan-Sarbecovirus detection which includes SARS-CoV-2  XR chest 1 view portable   ED Interpretation by Miguel Hess MD (04/27 1725)   Hyperinflation  No ptx  No infiltrate  Final Result by Holly Ogden MD (04/27 1910)      No focal airspace consolidation  Workstation performed: [id number]                    Procedures  Procedures         ED Course                               SBIRT 22yo+    Flowsheet Row Most Recent Value   SBIRT (25 yo +)    In order to provide better care to our patients, we are screening all of our patients for alcohol and drug use  Would it be okay to ask you these screening questions?  No Filed at: 04/27/2022 1706                    MDM  Number of Diagnoses or Management Options  COPD with acute exacerbation ([facility] [address] )  Influenza A  Diagnosis management comments: Symptoms greater than 48 hours therefore no therapeutic value in tamiflu       Amount and/or Complexity of Data Reviewed  Clinical lab tests: ordered and reviewed  Tests in the radiology section of CPT®: ordered and reviewed  Independent visualization of images, tracings, or specimens: yes        Disposition  Final diagnoses:   COPD with acute exacerbation ([place] [address] )   Influenza A     Time reflects when diagnosis was documented in both MDM as applicable and the Disposition within this note     Time User Action Codes Description Comment    4/27/2022  6:17 PM Ardella [place] Add [J44 1] COPD with acute exacerbation ([place] [address] )     4/27/2022  6:17 PM Ardella [place] Add [J10 1] Influenza A     4/27/2022  6:17 PM Leesa Tolliver Add [J44 9] Chronic obstructive pulmonary disease, unspecified COPD type ([place] [address] )     4/27/2022  6:18 PM Ardella [place] Modify [J44 9] Chronic obstructive pulmonary disease, unspecified COPD type ([place] [address] )     4/27/2022  6:19 PM Ardella [place] Modify [J44 9] Chronic obstructive pulmonary disease, unspecified COPD type [facility])       ED Disposition     ED Disposition   Discharge    Condition   Stable    Date/Time   Wed Apr 27, 2022  6:17 PM    [address] [facility] discharge to home/self care                 Follow-up Information     Follow up With Specialties Details Why Contact Ishan Alva MD Internal Medicine, Pediatrics In 1 week  [address]  [address] [place] [address]  [phone]            Discharge Medication List as of 4/27/2022  6:19 PM      START taking these medications    Details   azithromycin (ZITHROMAX) 250 mg tablet Take 2 tablets today then 1 tablet daily x 4 days, Normal      predniSONE 20 mg tablet Take 2 tablets (40 mg total) by mouth daily for 4 days, Starting Wed 4/27/2022, Until Sun 5/1/2022, Normal         CONTINUE these medications which have CHANGED    Details   albuterol (PROVENTIL HFA,VENTOLIN HFA) 90 mcg/act inhaler Inhale 2 puffs every 4 (four) hours as needed for wheezing or shortness of breath, Starting Wed 4/27/2022, Normal         CONTINUE these medications which have NOT CHANGED    Details   atorvastatin (LIPITOR) 20 mg tablet TAKE 1 TABLET (20 MG TOTAL) BY MOUTH DAILY, Starting u 1/20/2022, Normal fluticasone-salmeterol (Advair) 250-50 mcg/dose inhaler Inhale 1 puff 2 (two) times a day Rinse mouth after use , Starting Tue 9/21/2021, Normal      levothyroxine 200 mcg tablet TAKE 1 TABLET (200 MCG TOTAL) BY MOUTH DAILY, Starting Thu 1/20/2022, Normal      lisinopril-hydrochlorothiazide (PRINZIDE,ZESTORETIC) 10-12 5 MG per tablet TAKE 1 TABLET BY MOUTH DAILY, Starting Thu 1/20/2022, Normal      cetirizine (ZyrTEC) 10 mg tablet Take 1 tablet (10 mg total) by mouth daily, Starting Thu 1/14/2021, Normal             No discharge procedures on file      PDMP Review       Value Time User    PDMP Reviewed  Yes 1/14/2021 10:09 AM Tisha Powell DO          ED Provider  Electronically Signed by           Miguel Hess MD  05/14/22 0745

## 2022-05-16 NOTE — PATIENT INSTRUCTIONS
Hypertension in the Older Adult   WHAT YOU NEED TO KNOW:   What is hypertension? Hypertension is high blood pressure  Your blood pressure is the force of your blood moving against the walls of your arteries  Hypertension causes your blood pressure to get so high that your heart has to work much harder than normal  This can damage your heart  The cause of your hypertension may not be known  This is called essential or primary hypertension  Hypertension caused by another medical condition, such as kidney disease, is called secondary hypertension  Blood pressure often increases with age, but hypertension is not a normal part of aging  You may be able to control your blood pressure with lifestyle changes, medicines, or both  What do I need to know about the stages of hypertension? Normal blood pressure is 119/79 or lower   Your healthcare provider may only check your blood pressure each year if it stays at a normal level  Elevated blood pressure is 120/79 to 129/79   This is sometimes called prehypertension  Your healthcare provider may suggest lifestyle changes to help lower your blood pressure to a normal level  He or she may then check it again in 3 to 6 months  Stage 1 hypertension is 130/80  to 139/89   Your provider may recommend lifestyle changes, medication, and checks every 3 to 6 months until your blood pressure is controlled  Stage 2 hypertension is 140/90 or higher   Treatment may include lifestyle changes and medicines to lower your blood pressure  You will also need to have your blood pressure checked monthly until it is controlled  What increases my risk for hypertension?    Age older than 61 years    A family history of hypertension or heart disease    Obesity or lack of exercise    A medical condition, such as sleep apnea, kidney disease, thyroid disease, or high levels of aldosterone    Certain medicines, such as steroids, estrogen, or cold medicines    Too many high-sodium (salty) foods    Stress    Use of tobacco, alcohol, or illegal drugs    What are the signs and symptoms of hypertension? You may have no signs or symptoms, or you may have any of the following:  Headache    Blurred vision or changes in your vision    Chest pain    Dizziness or weakness    Trouble breathing    Nosebleeds    How is hypertension diagnosed? Your healthcare provider will take your blood pressure at several visits  You may also need to check your blood pressure at home  The provider will examine you and ask what medicines you take  He or she will also ask if you have a family history of high blood pressure and about any health conditions you have  You may need tests to check for causes of hypertension  Examples include blood or urine tests, an ultrasound, or an angiography  How is hypertension treated? Treatment may depend on other medical conditions and the cause of your hypertension  Treatment will also depend on your blood pressure goal  Your healthcare provider may recommend lifestyle changes to lower your blood pressure  You may also need the following medicines:  Antihypertensives  may be used to help lower your blood pressure  Several kinds of medicines are available  Your healthcare provider will choose medicines based on the kind of hypertension you have  You may need more than one type of medicine  Take the medicine exactly as directed  Some medicines can increase your risk for falls  Your healthcare provider will talk to you about ways to lower your risk for falls  Diuretics  help decrease extra fluid that collects in your body  This will help lower your blood pressure  You may urinate more often while you take this medicine  What can I do to manage hypertension? Check your blood pressure at home  Avoid smoking, caffeine, and exercise at least 30 minutes before checking your blood pressure  Sit and rest for 5 minutes before you take your blood pressure   Extend your arm and support it on a flat surface  Your arm should be at the same level as your heart  Follow the directions that came with your blood pressure monitor  Check your blood pressure 2 times, 1 minute apart, before you take your medicine in the morning  Also check your blood pressure before your evening meal  Keep a record of your readings and bring it to your follow-up visits  Ask your healthcare provider what your blood pressure should be  Manage any other health conditions you have  Health conditions such as diabetes can increase your risk for hypertension  Follow your healthcare provider's instructions and take all your medicines as directed  Ask about all medicines  Certain medicines can increase your blood pressure  Examples include decongestants, herbal supplements, and NSAIDs, such as ibuprofen  Your healthcare provider can tell you which medicines are safe for you to take  This includes prescription and over-the-counter medicines  You may also be taking several prescription medicines for other health conditions  It is important for your healthcare provider to know all your current medicines  Bring a list of your medicines or the pill bottles with you to follow-up visits  This will help your healthcare provider manage your current prescriptions and order new prescriptions  What lifestyle changes can I make to manage hypertension? Your healthcare provider may recommend you work with a team to manage hypertension  The team may include medical experts such as a dietitian, an exercise or physical therapist, and a behavior therapist  Your family members may be included in helping you create lifestyle changes, such as the following:     Limit sodium (salt) as directed  Too much sodium can affect your fluid balance  Your heart works too hard if you have too much fluid in your body  Even if you lower your daily sodium intake by 1,000 mg, it can help   For example, if you cook with salt, do not add any to your food at the table  Check labels to find low-sodium or no-salt-added foods  Some low-sodium foods use potassium salts for flavor  Too much potassium can also cause health problems  Your healthcare provider will tell you how much sodium and potassium are safe for you to have in a day  He or she may recommend that you limit sodium to 2,000 to 2,300 mg a day  Follow the meal plan recommended by your healthcare provider  A dietitian or your provider can give you more information on low-sodium plans or the DASH (Dietary Approaches to Stop Hypertension) eating plan  The DASH plan is low in sodium, processed sugar, unhealthy fats, and total fat  It is high in potassium, calcium, and fiber  These can be found in vegetables, fruit, and whole-grain foods  Be physically active throughout the day  Physical activity, such as exercise, can help control your blood pressure and your weight  Be physically active for at least 30 minutes per day, on most days of the week  Examples of physical activity include walking, swimming, gardening, and riding a bicycle  Your healthcare providers can help you create a physical activity plan  Decrease stress  This may help lower your blood pressure  Learn ways to relax, such as deep breathing or listening to music  Limit alcohol as directed  Alcohol can increase your blood pressure  Ask your healthcare provider if it is okay for you to drink alcohol  A drink of alcohol is 12 ounces of beer, 5 ounces of wine, or 1½ ounces of liquor  Your provider can tell you how many drinks are okay to have within 24 hours or within 1 week  Do not smoke  Nicotine and other chemicals in cigarettes and cigars can increase your blood pressure and also cause lung damage  Ask your healthcare provider for information if you currently smoke and need help to quit  E-cigarettes or smokeless tobacco still contain nicotine  Talk to your healthcare provider before you use these products      Call your local emergency number (911 in the [address] [facility] [address]) or have someone call if:   You have chest pain  You have any of the following signs of a heart attack:      Squeezing, pressure, or pain in your chest    You may  also have any of the following:     Discomfort or pain in your back, neck, jaw, stomach, or arm    Shortness of breath    Nausea or vomiting    Lightheadedness or a sudden cold sweat    You become confused or have trouble speaking  You suddenly feel lightheaded or have trouble breathing  When should I seek immediate care? You have a severe headache or vision loss  You have weakness in an arm or leg  You get dizzy and fall  When should I call my doctor? You feel faint, dizzy, confused, or drowsy  You have been taking your blood pressure medicine but your pressure is higher than your provider says it should be  Your blood pressure is lower than your healthcare provider said it should be  You have questions or concerns about your condition or care  CARE AGREEMENT:   You have the right to help plan your care  Learn about your health condition and how it may be treated  Discuss treatment options with your healthcare providers to decide what care you want to receive  You always have the right to refuse treatment  The above information is an  only  It is not intended as medical advice for individual conditions or treatments  Talk to your doctor, nurse or pharmacist before following any medical regimen to see if it is safe and effective for you  © Copyright [employer] 2022 Information is for End User's use only and may not be sold, redistributed or otherwise used for commercial purposes   All illustrations and images included in CareNotes® are the copyrighted property of [employer]  or [facility] Thais Arguelles   Influenza A Virus Vaccine, H5N1, Adjuvanted (By injection)   Influenza A Virus Vaccine, H5N1, Adjuvanted (in-floo-EN-za VYE-demond VAX-een, H5N1, EG-qyl-besdelisa)  Helps prevent infection with influenza (flu) virus  Brand Name(s):   There may be other brand names for this medicine  When This Medicine Should Not Be Used: This vaccine is not right for everyone  You should not receive it if you had an allergic reaction to influenza virus vaccine, H5N1, or from a previous dose of a flu shot  How to Use This Medicine:   Injectable  The vaccine is given as a shot into a muscle, usually in the upper arm  The shot could be given in the thighs for babies 6 to 11 months  A nurse or other health provider will give you this medicine  You will need two doses of this vaccine for it to work properly  Make sure you follow your doctor's instructions on when you or your child should come back for the second dose  The second dose is usually given 21 days after the first dose  Missed dose: You must use this medicine on a fixed schedule  Call your doctor or pharmacist if you miss a dose  Drugs and Foods to Avoid:   Ask your doctor or pharmacist before using any other medicine, including over-the-counter medicines, vitamins, and herbal products  Tell your doctor if you are using a medicine or treatment that weakens your immune system, including a steroid, radiation, or cancer treatment  This vaccine may not work as well if you are also using these medicines  However, your doctor may still want you to get the vaccine because it can give you some protection  Warnings While Using This Medicine:   Tell your doctor if you are pregnant or breastfeeding or if you have a weak immune system  Tell your doctor if you ever had an unusual reaction to a flu shot, including Guillain-Barré syndrome  This flu vaccine may not protect everyone who receives it  This vaccine will not treat flu symptoms if you have already been infected with the virus  Possible Side Effects While Using This Medicine:   Call your doctor right away if you notice any of these side effects:   Allergic reaction: Itching or hives, swelling in your face or hands, swelling or tingling in your mouth or throat, chest tightness, trouble breathing  Fever  Severe muscle weakness  If you notice these less serious side effects, talk with your doctor:   Headache, joint or muscle pain, tiredness  Irritability, sleepiness, tenderness, loss of appetite (in children)  Redness, pain, swelling, soreness, or a lump where the shot was given  If you notice other side effects that you think are caused by this medicine, tell your doctor  Call your doctor for medical advice about side effects  You may report side effects to FDA at 8-968-ILQ-1723    © Copyright [employer] 2022 Information is for End User's use only and may not be sold, redistributed or otherwise used for commercial purposes  The above information is an  only  It is not intended as medical advice for individual conditions or treatments  Talk to your doctor, nurse or pharmacist before following any medical regimen to see if it is safe and effective for you  Flu Shot (Vaccine) for Adults   WHAT YOU NEED TO KNOW:   What is the flu shot? The flu shot is a vaccine given in your upper arm or thigh to help prevent influenza (the flu)  The flu is caused by a virus  The virus spreads from person to person through coughing and sneezing  Several types of viruses cause the flu  The viruses change over time, so new vaccines are made each year  The vaccine begins to protect you about 2 weeks after you get it  Get the vaccine as soon as it is available  What should I tell my doctor before I get a flu shot? You have any serious allergies, such as an egg allergy that causes a severe reaction  The flu vaccine may contain a small amount of egg protein  The amount is so low that it is not likely to cause an allergic reaction  Egg-free vaccines may be available  You developed Guillain-Barré syndrome within 6 weeks of getting a flu shot   You may not be able to get any flu vaccine unless your provider feels the benefits outweigh the risks  Who should not get the flu shot or should wait to get it? You may need to wait to get the flu shot, or instead get the nasal flu vaccine  Tell your healthcare provider if:  You had an allergic reaction to a flu shot or any part of it  You are sick or have a fever of 101°F (38 3°C) or higher  What are the risks of the flu shot? The vaccine may cause mild symptoms, such as a fever, headache, and muscle aches  You may also have mild to moderate soreness or redness at the area where you were given the shot  You may still get the flu after you receive the vaccine  You may have an allergic reaction to the vaccine  This can be life-threatening  Call your local emergency number (911 in the [address] [facility]) if:   Your mouth and throat are swollen  You are wheezing or having trouble breathing  You have chest pain or your heart is beating faster than normal for you  You feel like you are going to faint  When should I seek immediate care? Your face is red or swollen  You have hives that spread over your body  When should I call my doctor? You feel weak or dizzy  You have increased pain, redness, or swelling around the area where the shot was given  You have questions or concerns about the flu shot  CARE AGREEMENT:   You have the right to help plan your care  Learn about your health condition and how it may be treated  Discuss treatment options with your healthcare providers to decide what care you want to receive  You always have the right to refuse treatment  The above information is an  only  It is not intended as medical advice for individual conditions or treatments  Talk to your doctor, nurse or pharmacist before following any medical regimen to see if it is safe and effective for you    © Copyright [employer] 2022 Information is for End User's use only and may not be sold, redistributed or otherwise used for commercial purposes  All illustrations and images included in CareNotes® are the copyrighted property of [employer]  or [place] Thais Blevins  COPD (Chronic Obstructive Pulmonary Disease)   WHAT YOU NEED TO KNOW:   COPD (chronic obstructive pulmonary disease) can get worse quickly  Your healthcare providers will help you create a care plan to use at home  The plan will give directions on how to prevent or manage shortness of breath  Your family members or anyone who cares for you will also get directions to help you  DISCHARGE INSTRUCTIONS:   Call your local emergency number (911 in the [address] [facility]) if:   You feel lightheaded, short of breath, and have chest pain  Return to the emergency department if:   You cough up blood  You are confused, dizzy, or feel faint  Your arm or leg feels warm, tender, and painful  It may look swollen and red  Call your doctor if:   You have increased shortness of breath  You need more medicine than usual to control your symptoms  You are coughing or wheezing more than usual     You are coughing up more mucus, or it has a new color or odor  You gain more than 3 pounds in a week  You have a fever, a runny or stuffy nose, and a sore throat, or other cold or flu symptoms  Your skin, lips, or nails start to turn blue  You have swelling in your legs or ankles  You are very tired or weak for more than a day  You notice changes in your mood, or changes in your ability to think or concentrate  You have questions or concerns about your condition or care  Medicines:   Short-acting bronchodilators  may be called rescue inhalers or relievers  They relieve sudden, severe symptoms and start to work right away  Long-acting bronchodilators  may be called controllers  This medicine helps open the airways over time, and is used to decrease and prevent breathing problems   Long-acting bronchodilators should not be used to treat sudden, severe symptoms, such as trouble breathing  Antibiotics may be given for up to 5 days to treat a bacterial infection during an exacerbation  Take your medicine as directed  Contact your healthcare provider if you think your medicine is not helping or if you have side effects  Tell him or her if you are allergic to any medicine  Keep a list of the medicines, vitamins, and herbs you take  Include the amounts, and when and why you take them  Bring the list or the pill bottles to follow-up visits  Carry your medicine list with you in case of an emergency  Help make breathing easier:   Use pursed-lip breathing any time you feel short of breath  Take a deep breath in through your nose  Slowly breathe out through your mouth with your lips pursed  Try to take 2 times as long to breathe out as to breathe in  This helps you get rid of as much air from your lungs as possible  You can also practice this breathing pattern while you bend, lift, climb stairs, or exercise  It slows down your breathing and helps move more air in and out of your lungs  Avoid anything that makes your symptoms worse  Stay out of high altitudes and places with high humidity  Stay inside, or cover your mouth and nose with a scarf when you are outside in cold weather  Stay inside on days when air pollution or pollen counts are high  Do not use aerosol sprays such as deodorant, bug spray, and hairspray  Exercise as directed  Your healthcare provider may recommend at least 20 minutes of exercise each day to help increase your energy and decrease shortness of breath  Talk to your provider about the best exercise plan for you  Manage COPD and help prevent exacerbations:  COPD is a serious condition that gets worse over time  A COPD exacerbation means your symptoms suddenly get worse  It is important to prevent exacerbations  An exacerbation can cause more lung damage   COPD cannot be cured, but you can take action to feel better and prevent exacerbations:  Do not smoke   Nicotine and other chemicals in cigarettes and cigars can cause lung damage and make your COPD worse  Ask your healthcare provider for information if you currently smoke and need help to quit  E-cigarettes or smokeless tobacco still contain nicotine  Talk to your healthcare provider before you use these products  Avoid secondhand smoke  This is smoke another person exhales  Even if you have never smoked or have quit, it is important to avoid secondhand smoke  This smoke can also cause lung damage or trigger an exacerbation  Go to pulmonary rehabilitation (rehab) if directed  Rehab is a program run by specialists who help you learn to manage COPD  Examples include a pulmonologist (lung specialist), dietitian, or exercise therapist  The specialists will help you make a plan to avoid triggers that cause an exacerbation  Take your medicines as directed  Refill your medicines before you are out so that you do not miss a dose  Ask your healthcare provider if you have any questions on how to take your medicines  Protect yourself from germs  Germs can get into your lungs and cause an infection  An infection in your lungs can create more mucus and make it harder to breathe  An infection can also create swelling in your airway and prevent air from getting in  You can decrease your risk for infection by doing the following:         Wash your hands often with soap and water  Carry germ-killing gel with you  You can use the gel to clean your hands when soap and water are not available  Do not touch your eyes, nose, or mouth unless you have washed your hands first     Always cover your mouth when you cough  Cough into a tissue or your shirtsleeve so you do not spread germs from your hands  Try to avoid people who have a cold or the flu  If you are sick, stay away from others as much as possible  Ask about vaccines you may need   Influenza (the flu), pneumonia, and COVID-19 can become life-threatening for a person who has COPD  Get a yearly flu vaccine as soon as recommended, usually in September or October  The pneumonia vaccine may be given every 5 years, or as directed  COVID-19 vaccines are available in shots given in 1 or 2 doses  Your healthcare provider can tell you if you should also get other vaccines, and when to get them  Drink liquids as directed  You may need to drink more liquid than usual  Liquid will help to keep your air passages moist and help you cough up mucus  Ask how much liquid to drink each day and which liquids are best for you  Follow up with your doctor as directed: You may need more tests  Your doctor may refer you to a specialist, depending on your needs  Some specialist services may be available through your pulmonary rehab program  Write down your questions so you remember to ask them during your visits  © Copyright [employer] 2022 Information is for End User's use only and may not be sold, redistributed or otherwise used for commercial purposes  All illustrations and images included in CareNotes® are the copyrighted property of [employer]  or [facility] Thais Arguelles   The above information is an  only  It is not intended as medical advice for individual conditions or treatments  Talk to your doctor, nurse or pharmacist before following any medical regimen to see if it is safe and effective for you

## 2022-05-16 NOTE — PROGRESS NOTES
BMI Counseling: Body mass index is 32 58 kg/m²  The BMI is above normal  Nutrition recommendations include decreasing portion sizes, encouraging healthy choices of fruits and vegetables, decreasing fast food intake, consuming healthier snacks, limiting drinks that contain sugar, moderation in carbohydrate intake, increasing intake of lean protein, reducing intake of saturated and trans fat and reducing intake of cholesterol  Exercise recommendations include vigorous physical activity 75 minutes/week, exercising 3-5 times per week, obtaining a gym membership and strength training exercises  No pharmacotherapy was ordered  Rationale for BMI follow-up plan is due to patient being overweight or obese  Depression Screening and Follow-up Plan: Patient declines further evaluation by mental health professional and/or medications  Brief counseling provided  Will re-evaluate at next office visit  Depression likely due to other medical condition  Will treat underlying condition  Assessment/Plan:         Problem List Items Addressed This Visit        Endocrine    Hypothyroidism     Patient noted to have history of hypothyroidism currently on levothyroxine 200 mcg p o  Daily  Follow-up lab work required at this time  Patient instructed have TSH level done for evaluation  Relevant Medications    predniSONE 20 mg tablet       Respiratory    Chronic obstructive pulmonary disease ([facility] [address] )     Patient has longstanding history of COPD  Currently maintained on Advair 250-50 mcg dose inhaler 2 puffs daily, albuterol HFA 90 mcg inhaler every 4 hours as needed for shortness of breath or wheezing, and referred to pulmonary for evaluation of longstanding COPD and asthma exacerbations  Repeat PFTs need evaluation             Relevant Medications    albuterol (PROVENTIL HFA,VENTOLIN HFA) 90 mcg/act inhaler    cetirizine (ZyrTEC) 10 mg tablet    fluticasone-salmeterol (Advair) 250-50 mcg/dose inhaler    albuterol inhalation solution 2 5 mg (Completed)    predniSONE 20 mg tablet    Other Relevant Orders    Ambulatory Referral to Pulmonology    Allergic rhinitis     Patient refuses Flonase nasal spray  Relevant Medications    cetirizine (ZyrTEC) 10 mg tablet    predniSONE 20 mg tablet    Type A influenza - Primary     Patient treated for influenza type A sub post discharge from the ER  Patient had completed azithromycin post discharge  Currently still with persistent cough  Cardiovascular and Mediastinum    Essential hypertension     Patient's blood pressure stable 126/66  Patient currently maintained on lisinopril-hydrochlorothiazide 10-12 5 mg p o  Daily  Instructed on low-sodium diet as well as exercise 3-5 times per week for at least 75 minutes of moderate cardiovascular activity  Genitourinary    Stage 3 chronic kidney disease ([facility] [address] )     Lab Results   Component Value Date    EGFR 38 09/29/2021    CREATININE 1 48 (H) 09/29/2021    CREATININE 1 33 (H) 03/25/2014   Repeat CMP lab work required patient encouraged to get lab work done at this time  Patient courage to drink fluids  Currently on lisinopril-hydrochlorothiazide 10-12 5 mg  Will evaluate dosage based on results of renal function  Other    Depression     PHQ 9 completed at this time patient refuses medication management or psychiatric consultation  Indicates the side effects of the medication have been horrible in the past            Tobacco use     Patient currently smokes half pack of cigarettes per day  Indicates that she has smoked 4 packs per day in the past and currently refuses medication management  Patient encouraged to abstain from smoking due to upper respiratory symptoms and chronic history of bronchitis  Cough     Patient placed on prednisone 20 mg p o  Daily for next 7 days  Albuterol treatment received in the office with positive results    Patient is coughing less at this time as well as her lungs sound clear  Patient to follow-up in 1 week  Follow-up examination     Patient for follow-up evaluation sub post evaluation the ER for flu type a with upper respiratory symptoms  Treated with azithromycin  Currently present with persistent cough  Subjective:      Patient ID: Víctor Trivedi is a 63 y o  female  Follow-up evaluation 2022 for influenza type A with exacerbations of COPD  Currently still having persistent cough with intermittent shortness of breath  History of seasonal allergies noted  Patient to continue her Zyrtec, Advair maintenance inhaler and albuterol rescue inhaler  Patient follow-up in 1 week  The following portions of the patient's history were reviewed and updated as appropriate:   Past Medical History:  She has a past medical history of Allergic, Allergy to sulfa drugs, Anxiety, Chronic back pain, COPD (chronic obstructive [facility] disease) ([facility] Depression, Enlarged heart, GERD (gastroesophageal reflux disease), Headache, Heart disease, Heart murmur, Heart valve disorder, HTN (hypertension), Hyperglycemia, Hyperlipidemia, Hypothyroidism, Insomnia, Irritable bowel syndrome with diarrhea, Kidney stones, Migraines, MVP (mitral valve prolapse), Myxomatous mitral valve, Seasonal allergies, Sickle cell trait ([facility] [address] ), Stage 3b chronic kidney disease ([facility] ), Thalassemia carrier, Tobacco user, Urinary incontinence, and Vitamin D deficiency  ,  _______________________________________________________________________  Medical Problems:  does not have any pertinent problems on file ,  _______________________________________________________________________  Past Surgical History:   has a past surgical history that includes  section (); Cholecystectomy (); TONSILLECTOMY; and ALVEOPLASTY (09/15/2016)  ,  _______________________________________________________________________  Family History:  family history includes Brain cancer in her mother; Heart attack in her father and son; Heart disease in her father and maternal grandmother; Kidney cancer in her mother; Kidney disease in her mother and sister; Lung cancer in her mother; Other in her father and mother; Sickle cell trait in her sister; Stroke in her father ,  _______________________________________________________________________  Social History:   reports that she has been smoking cigarettes  She has been smoking about 1 00 pack per day  She has never used smokeless tobacco  She reports current alcohol use  She reports that she does not use drugs  ,  _______________________________________________________________________  Allergies:  is allergic to clindamycin and sulfa antibiotics     _______________________________________________________________________  Current Outpatient Medications   Medication Sig Dispense Refill    albuterol (PROVENTIL HFA,VENTOLIN HFA) 90 mcg/act inhaler Inhale 2 puffs every 4 (four) hours as needed for wheezing or shortness of breath 18 g 0    atorvastatin (LIPITOR) 20 mg tablet TAKE 1 TABLET (20 MG TOTAL) BY MOUTH DAILY 90 tablet 1    cetirizine (ZyrTEC) 10 mg tablet Take 1 tablet (10 mg total) by mouth in the morning  30 tablet 2    fluticasone-salmeterol (Advair) 250-50 mcg/dose inhaler Inhale 1 puff  in the morning and 1 puff in the evening  Rinse mouth after use    60 blister 5    levothyroxine 200 mcg tablet TAKE 1 TABLET (200 MCG TOTAL) BY MOUTH DAILY 90 tablet 1    lisinopril-hydrochlorothiazide (PRINZIDE,ZESTORETIC) 10-12 5 MG per tablet TAKE 1 TABLET BY MOUTH DAILY 90 tablet 1    predniSONE 20 mg tablet Take 1 tablet (20 mg total) by mouth in the morning  7 tablet 0     No current facility-administered medications for this visit      _______________________________________________________________________  Review of Systems   Constitutional: Positive for fatigue   Negative for activity change, appetite change, chills, fever and unexpected weight change  HENT: Negative for congestion, ear discharge, ear pain, nosebleeds, postnasal drip, rhinorrhea, sinus pressure, sinus pain, sneezing, sore throat and voice change  Eyes: Negative for pain, redness and visual disturbance  Respiratory: Negative for cough, chest tightness, shortness of breath and wheezing  Cardiovascular: Negative for chest pain and palpitations  Gastrointestinal: Negative for abdominal distention, abdominal pain, constipation, diarrhea, nausea and vomiting  Endocrine: Negative  Genitourinary: Negative for difficulty urinating, dysuria, flank pain, frequency, hematuria and urgency  Musculoskeletal: Negative for arthralgias and myalgias  Skin: Negative  Allergic/Immunologic: Negative  Neurological: Negative  Hematological: Negative  Psychiatric/Behavioral: Negative  Objective:  Vitals:    05/17/22 1320   BP: 126/66   BP Location: Left arm   Patient Position: Sitting   Cuff Size: Adult   Pulse: 76   Resp: 20   Temp: 97 8 °F (36 6 °C)   TempSrc: Temporal   SpO2: 98%   Weight: 82 1 kg (181 lb)   Height: 5' 2 5" (1 588 m)     Body mass index is 32 58 kg/m²  Physical Exam  Vitals and nursing note reviewed  Constitutional:       Appearance: Normal appearance  She is well-developed  She is obese  HENT:      Head: Normocephalic and atraumatic  Right Ear: Tympanic membrane, ear canal and external ear normal       Left Ear: Tympanic membrane, ear canal and external ear normal       Nose: Nose normal  No congestion or rhinorrhea  Mouth/Throat:      Mouth: Mucous membranes are moist       Pharynx: No oropharyngeal exudate or posterior oropharyngeal erythema  Eyes:      Extraocular Movements: Extraocular movements intact  Conjunctiva/sclera: Conjunctivae normal       Pupils: Pupils are equal, round, and reactive to light  Cardiovascular:      Rate and Rhythm: Normal rate and regular rhythm  Pulses: Normal pulses        Heart sounds: Normal heart sounds  No murmur heard  Pulmonary:      Effort: Pulmonary effort is normal       Breath sounds: Normal breath sounds  Abdominal:      General: Bowel sounds are normal       Palpations: Abdomen is soft  Musculoskeletal:         General: Normal range of motion  Cervical back: Normal range of motion  Skin:     General: Skin is warm  Capillary Refill: Capillary refill takes 2 to 3 seconds  Neurological:      General: No focal deficit present  Mental Status: She is alert and oriented to person, place, and time     Psychiatric:         Mood and Affect: Mood normal          Behavior: Behavior normal

## 2022-05-17 ENCOUNTER — OFFICE VISIT (OUTPATIENT)
Dept: FAMILY MEDICINE CLINIC | Facility: CLINIC | Age: 62
End: 2022-05-17
Payer: MEDICARE

## 2022-05-17 VITALS
OXYGEN SATURATION: 98 % | RESPIRATION RATE: 20 BRPM | DIASTOLIC BLOOD PRESSURE: 66 MMHG | HEIGHT: 63 IN | BODY MASS INDEX: 32.07 KG/M2 | WEIGHT: 181 LBS | HEART RATE: 76 BPM | TEMPERATURE: 97.8 F | SYSTOLIC BLOOD PRESSURE: 126 MMHG

## 2022-05-17 DIAGNOSIS — R05.9 COUGH: ICD-10-CM

## 2022-05-17 DIAGNOSIS — Z09 FOLLOW-UP EXAMINATION: ICD-10-CM

## 2022-05-17 DIAGNOSIS — J44.9 CHRONIC OBSTRUCTIVE PULMONARY DISEASE, UNSPECIFIED COPD TYPE (HCC): ICD-10-CM

## 2022-05-17 DIAGNOSIS — Z72.0 TOBACCO USE: ICD-10-CM

## 2022-05-17 DIAGNOSIS — N18.30 STAGE 3 CHRONIC KIDNEY DISEASE, UNSPECIFIED WHETHER STAGE 3A OR 3B CKD (HCC): ICD-10-CM

## 2022-05-17 DIAGNOSIS — J30.9 ALLERGIC RHINITIS, UNSPECIFIED SEASONALITY, UNSPECIFIED TRIGGER: ICD-10-CM

## 2022-05-17 DIAGNOSIS — I10 ESSENTIAL HYPERTENSION: ICD-10-CM

## 2022-05-17 DIAGNOSIS — E03.9 HYPOTHYROIDISM, UNSPECIFIED TYPE: ICD-10-CM

## 2022-05-17 DIAGNOSIS — J10.1 TYPE A INFLUENZA: Primary | ICD-10-CM

## 2022-05-17 DIAGNOSIS — F32.A DEPRESSION, UNSPECIFIED DEPRESSION TYPE: ICD-10-CM

## 2022-05-17 PROCEDURE — 94640 AIRWAY INHALATION TREATMENT: CPT | Performed by: NURSE PRACTITIONER

## 2022-05-17 PROCEDURE — 99215 OFFICE O/P EST HI 40 MIN: CPT | Performed by: NURSE PRACTITIONER

## 2022-05-17 RX ORDER — ALBUTEROL SULFATE 90 UG/1
2 AEROSOL, METERED RESPIRATORY (INHALATION) EVERY 4 HOURS PRN
Qty: 18 G | Refills: 0 | Status: SHIPPED | OUTPATIENT
Start: 2022-05-17

## 2022-05-17 RX ORDER — PREDNISONE 20 MG/1
20 TABLET ORAL DAILY
Qty: 7 TABLET | Refills: 0 | Status: SHIPPED | OUTPATIENT
Start: 2022-05-17

## 2022-05-17 RX ORDER — CETIRIZINE HYDROCHLORIDE 10 MG/1
10 TABLET ORAL DAILY
Qty: 30 TABLET | Refills: 2 | Status: SHIPPED | OUTPATIENT
Start: 2022-05-17 | End: 2022-06-02 | Stop reason: SDUPTHER

## 2022-05-17 RX ORDER — ALBUTEROL SULFATE 2.5 MG/3ML
2.5 SOLUTION RESPIRATORY (INHALATION) ONCE
Status: COMPLETED | OUTPATIENT
Start: 2022-05-17 | End: 2022-05-17

## 2022-05-17 RX ADMIN — ALBUTEROL SULFATE 2.5 MG: 2.5 SOLUTION RESPIRATORY (INHALATION) at 13:42

## 2022-05-17 NOTE — ASSESSMENT & PLAN NOTE
PHQ 9 completed at this time patient refuses medication management or psychiatric consultation    Indicates the side effects of the medication have been horrible in the past

## 2022-05-17 NOTE — ASSESSMENT & PLAN NOTE
Patient placed on prednisone 20 mg p o  Daily for next 7 days  Albuterol treatment received in the office with positive results  Patient is coughing less at this time as well as her lungs sound clear  Patient to follow-up in 1 week

## 2022-05-17 NOTE — ASSESSMENT & PLAN NOTE
Patient's blood pressure stable 126/66  Patient currently maintained on lisinopril-hydrochlorothiazide 10-12 5 mg p o  Daily  Instructed on low-sodium diet as well as exercise 3-5 times per week for at least 75 minutes of moderate cardiovascular activity

## 2022-05-17 NOTE — ASSESSMENT & PLAN NOTE
Patient has longstanding history of COPD  Currently maintained on Advair 250-50 mcg dose inhaler 2 puffs daily, albuterol HFA 90 mcg inhaler every 4 hours as needed for shortness of breath or wheezing, and referred to pulmonary for evaluation of longstanding COPD and asthma exacerbations  Repeat PFTs need evaluation

## 2022-05-17 NOTE — ASSESSMENT & PLAN NOTE
Patient for follow-up evaluation sub post evaluation the ER for flu type a with upper respiratory symptoms  Treated with azithromycin  Currently present with persistent cough

## 2022-05-17 NOTE — ASSESSMENT & PLAN NOTE
Patient treated for influenza type A sub post discharge from the ER  Patient had completed azithromycin post discharge  Currently still with persistent cough

## 2022-05-17 NOTE — ASSESSMENT & PLAN NOTE
Patient noted to have history of hypothyroidism currently on levothyroxine 200 mcg p o  Daily  Follow-up lab work required at this time  Patient instructed have TSH level done for evaluation

## 2022-05-17 NOTE — ASSESSMENT & PLAN NOTE
Patient currently smokes half pack of cigarettes per day  Indicates that she has smoked 4 packs per day in the past and currently refuses medication management  Patient encouraged to abstain from smoking due to upper respiratory symptoms and chronic history of bronchitis

## 2022-05-17 NOTE — ASSESSMENT & PLAN NOTE
Lab Results   Component Value Date    EGFR 38 09/29/2021    CREATININE 1 48 (H) 09/29/2021    CREATININE 1 33 (H) 03/25/2014   Repeat CMP lab work required patient encouraged to get lab work done at this time  Patient courage to drink fluids  Currently on lisinopril-hydrochlorothiazide 10-12 5 mg  Will evaluate dosage based on results of renal function

## 2022-05-23 PROBLEM — Z12.4 SCREENING FOR CERVICAL CANCER: Status: ACTIVE | Noted: 2022-05-23

## 2022-05-23 NOTE — PATIENT INSTRUCTIONS
Hypertension   WHAT YOU NEED TO KNOW:   What is hypertension? Hypertension is high blood pressure  Your blood pressure is the force of your blood moving against the walls of your arteries  Hypertension causes your blood pressure to get so high that your heart has to work much harder than normal  This can damage your heart  The cause of hypertension may not be known  This is called essential or primary hypertension  Hypertension caused by another medical condition, such as kidney disease, is called secondary hypertension  What do I need to know about the stages of hypertension? Normal blood pressure is 119/79 or lower   Your healthcare provider may only check your blood pressure each year if it stays at a normal level  Elevated blood pressure is 120/79 to 129/79   This is sometimes called prehypertension  Your healthcare provider may suggest lifestyle changes to help lower your blood pressure to a normal level  He or she may then check it again in 3 to 6 months  Stage 1 hypertension is 130/80  to 139/89   Your provider may recommend lifestyle changes, medication, and checks every 3 to 6 months until your blood pressure is controlled  Stage 2 hypertension is 140/90 or higher   Your provider will recommend lifestyle changes and have you take 2 kinds of hypertension medicines  You will also need to have your blood pressure checked monthly until it is controlled  What increases my risk for hypertension? Age older than 54 years (men) or 72 (women)    Stress, or a family history of hypertension or heart disease    Obesity, lack of exercise, or too many high-sodium foods    Use of tobacco, alcohol, or illegal drugs    A medical condition, such as diabetes, kidney disease, thyroid disease, or adrenal gland disorder    Certain medicines, such as steroids or birth control pills    What are the signs and symptoms of hypertension?   You may have no signs or symptoms, or you may have any of the following:  Headache    Blurred vision    Chest pain    Dizziness or weakness    Trouble breathing    Nosebleeds    How is hypertension diagnosed? Your healthcare provider will take your blood pressure at several visits  You may also need to check your blood pressure at home  The provider will examine you and ask what medicines you take  He or she will also ask if you have a family history of high blood pressure and about any health conditions you have  He or she will also check your blood pressure and weight and examine your heart, lungs, and eyes  You may need any of the following tests: An ambulatory blood pressure monitor (ABPM)  is a device that you wear  ABPM measures your blood pressure while you do your regular daily activities  It records your blood pressure every 15 to 30 minutes during the day  It also records your blood pressure every 15 minutes to 1 hour at night  The recorded blood pressures help your healthcare provider know if you have hypertension not seen at your appointment  Blood tests  may help healthcare providers find the cause of your hypertension  Blood tests can also help find other health problems caused by hypertension  Urine tests  will be done to check your kidney function  Kidney problems can increase your risk for hypertension  Which medicines are used to treat hypertension? Antihypertensives  may be used to help lower your blood pressure  Several kinds of medicines are available  Your healthcare provider will choose medicines based on the kind of hypertension you have  You may need more than one type of medicine  Take the medicine exactly as directed  Diuretics  help decrease extra fluid that collects in your body  This will help lower your blood pressure  You may urinate more often while you take this medicine  Cholesterol medicine  helps lower your cholesterol level   A low cholesterol level helps prevent heart disease and makes it easier to control your blood pressure  What can I do to manage hypertension? Check your blood pressure at home  Avoid smoking, caffeine, and exercise at least 30 minutes before checking your blood pressure  Sit and rest for 5 minutes before you take your blood pressure  Extend your arm and support it on a flat surface  Your arm should be at the same level as your heart  Follow the directions that came with your blood pressure monitor  Check your blood pressure 2 times, 1 minute apart, before you take your medicine in the morning  Also check your blood pressure before your evening meal  Keep a record of your readings and bring it to your follow-up visits  Ask your healthcare provider what your blood pressure should be  Manage any other health conditions you have  Health conditions such as diabetes can increase your risk for hypertension  Follow your healthcare provider's instructions and take all your medicines as directed  Ask about all medicines  Certain medicines can increase your blood pressure  Examples include oral birth control pills, decongestants, herbal supplements, and NSAIDs, such as ibuprofen  Your healthcare provider can tell you which medicines are safe for you to take  This includes prescription and over-the-counter medicines  What lifestyle changes can I make to manage hypertension? Your healthcare provider may recommend you work with a team to manage hypertension  The team may include medical experts such as a dietitian, an exercise or physical therapist, and a behavior therapist  Your family members may be included in helping you create lifestyle changes  Limit sodium (salt) as directed  Too much sodium can affect your fluid balance  Check labels to find low-sodium or no-salt-added foods  Some low-sodium foods use potassium salts for flavor  Too much potassium can also cause health problems  Your healthcare provider will tell you how much sodium and potassium are safe for you to have in a day   He or she may recommend that you limit sodium to 2,300 mg a day  Follow the meal plan recommended by your healthcare provider  A dietitian or your provider can give you more information on low-sodium plans or the DASH (Dietary Approaches to Stop Hypertension) eating plan  The DASH plan is low in sodium, processed sugar, unhealthy fats, and total fat  It is high in potassium, calcium, and fiber  These can be found in vegetables, fruit, and whole-grain foods  Be physically active throughout the day  Physical activity, such as exercise, can help control your blood pressure and your weight  Be physically active for at least 30 minutes per day, on most days of the week  Include aerobic activity, such as walking or riding a bicycle  Also include strength training at least 2 times each week  Your healthcare providers can help you create a physical activity plan  Decrease stress  This may help lower your blood pressure  Learn ways to relax, such as deep breathing or listening to music  Limit alcohol as directed  Alcohol can increase your blood pressure  A drink of alcohol is 12 ounces of beer, 5 ounces of wine, or 1½ ounces of liquor  Do not smoke  Nicotine and other chemicals in cigarettes and cigars can increase your blood pressure and also cause lung damage  Ask your healthcare provider for information if you currently smoke and need help to quit  E-cigarettes or smokeless tobacco still contain nicotine  Talk to your healthcare provider before you use these products  Call your local emergency number [phone] in the [address] [facility]) or have someone call if:   You have chest pain      You have any of the following signs of a heart attack:      Squeezing, pressure, or pain in your chest    You may  also have any of the following:     Discomfort or pain in your back, neck, jaw, stomach, or arm    Shortness of breath    Nausea or vomiting    Lightheadedness or a sudden cold sweat    You become confused or have trouble speaking  You suddenly feel lightheaded or have trouble breathing  When should I seek immediate care? You have a severe headache or vision loss  You have weakness in an arm or leg  When should I call my doctor? You feel faint, dizzy, confused, or drowsy  You have been taking your blood pressure medicine but your pressure is higher than your provider says it should be  You have questions or concerns about your condition or care  CARE AGREEMENT:   You have the right to help plan your care  Learn about your health condition and how it may be treated  Discuss treatment options with your healthcare providers to decide what care you want to receive  You always have the right to refuse treatment  The above information is an  only  It is not intended as medical advice for individual conditions or treatments  Talk to your doctor, nurse or pharmacist before following any medical regimen to see if it is safe and effective for you  © [employer] 2022 Information is for End User's use only and may not be sold, redistributed or otherwise used for commercial purposes  All illustrations and images included in CareNotes® are the copyrighted property of [employer]  or [address] [place] [address]  COPD (Chronic Obstructive Pulmonary Disease)   AMBULATORY CARE:   COPD (chronic obstructive pulmonary disease)  is a lung disease that causes breathing problems  COPD usually develops from years of irritation and inflammation in your lungs  Obstructive means airflow is blocked  This limits airflow out of your lungs  Smoking, breathing in pollution, genetics, or a history of lung infections can increase your risk for COPD         Common symptoms include the following:   Shortness of breath    A dry cough    Coughing fits that bring up mucus from your lungs    Wheezing and chest tightness    Call your local emergency number (911 in the [address] [facility]) if:   You feel lightheaded, short of breath, and have chest pain  Seek care immediately if:   You cough up blood  You are confused, dizzy, or feel faint  Your arm or leg feels warm, tender, and painful  It may look swollen and red  Call your doctor if:   You have increased shortness of breath  You need more medicine than usual to control your symptoms  You are coughing or wheezing more than usual     You are coughing up more mucus, or it has a new color or odor  You gain more than 3 pounds in a week  You have a fever, a runny or stuffy nose, and a sore throat, or other cold or flu symptoms  Your skin, lips, or nails start to turn blue  You have swelling in your legs or ankles  You are very tired or weak for more than a day  You notice changes in your mood, or changes in your ability to think or concentrate  You have questions or concerns about your condition or care  Treatment:  Healthcare providers will work with you to create a care plan  The plan will contain goals defined by you and your providers  It will include steps to help you reach your goals within a specific time frame  The plan may change over time as your goals and needs change  The following may be included in your plan:  Medicines  may be used to open your airway or decrease swelling and inflammation in your lungs  Antibiotics may be given for up to 5 days to treat a bacterial infection during an exacerbation  Medicines can relieve certain kinds of symptoms  A short-acting medicine relieves symptoms quickly  This may be called rescue medicine  A long-acting medicine controls or prevents symptoms  This may be called maintenance medicine  Your care plan will have directions for when to take each kind of medicine  Your healthcare provider will give you more information about the medicines you are given and how to use them safely  Extra oxygen  may help you breathe easier and feel more alert if you have severe COPD   Do not increase your oxygen levels without speaking to your doctor first     Surgery  is sometimes done if all other treatments have failed  A lung reduction is surgery to remove part of your damaged lung  A lung transplant is the replacement of your lung with a donor lung  Help make breathing easier:   Use pursed-lip breathing any time you feel short of breath  Take a deep breath in through your nose  Slowly breathe out through your mouth with your lips pursed  Try to take 2 times as long to breathe out as to breathe in  This helps you get rid of as much air from your lungs as possible  You can also practice this breathing pattern while you bend, lift, climb stairs, or exercise  It slows down your breathing and helps move more air in and out of your lungs  Avoid anything that makes your symptoms worse  Stay out of high altitudes and places with high humidity  Stay inside, or cover your mouth and nose with a scarf when you are outside in cold weather  Stay inside on days when air pollution or pollen counts are high  Do not use aerosol sprays such as deodorant, bug spray, and hairspray  Exercise as directed  Your healthcare provider may recommend at least 20 minutes of exercise each day to help increase your energy and decrease shortness of breath  Talk to your provider about the best exercise plan for you  Manage COPD and help prevent exacerbations:  COPD is a serious condition that gets worse over time  A COPD exacerbation means your symptoms suddenly get worse  It is important to prevent exacerbations  An exacerbation can cause more lung damage  COPD cannot be cured, but you can take action to feel better and prevent exacerbations:  Do not smoke  Nicotine and other chemicals in cigarettes and cigars can cause lung damage and make your COPD worse  Ask your healthcare provider for information if you currently smoke and need help to quit  E-cigarettes or smokeless tobacco still contain nicotine   Talk to your healthcare provider before you use these products  Avoid secondhand smoke  This is smoke another person exhales  Even if you have never smoked or have quit, it is important to avoid secondhand smoke  This smoke can also cause lung damage or trigger an exacerbation  Go to pulmonary rehabilitation (rehab) if directed  Rehab is a program run by specialists who help you learn to manage COPD  Examples include a pulmonologist (lung specialist), dietitian, or exercise therapist  The specialists will help you make a plan to avoid triggers that cause an exacerbation  Take your medicines as directed  Refill your medicines before you are out so that you do not miss a dose  Ask your healthcare provider if you have any questions on how to take your medicines  Protect yourself from germs  Germs can get into your lungs and cause an infection  An infection in your lungs can create more mucus and make it harder to breathe  An infection can also create swelling in your airway and prevent air from getting in  You can decrease your risk for infection by doing the following:         Wash your hands often with soap and water  Carry germ-killing gel with you  You can use the gel to clean your hands when soap and water are not available  Do not touch your eyes, nose, or mouth unless you have washed your hands first     Always cover your mouth when you cough  Cough into a tissue or your shirtsleeve so you do not spread germs from your hands  Try to avoid people who have a cold or the flu  If you are sick, stay away from others as much as possible  Ask about vaccines you may need  Influenza (the flu), pneumonia, and COVID-19 can become life-threatening for a person who has COPD  Get a yearly flu vaccine as soon as recommended, usually in September or October  The pneumonia vaccine may be given every 5 years, or as directed  COVID-19 vaccines are available in shots given in 1 or 2 doses   Your healthcare provider can tell you if you should also get other vaccines, and when to get them  Drink liquids as directed  You may need to drink more liquid than usual  Liquid will help to keep your air passages moist and help you cough up mucus  Ask how much liquid to drink each day and which liquids are best for you  Follow up with your doctor as directed: You may need more tests  Your doctor may refer you to a specialist, depending on your needs  Some specialist services may be available through your pulmonary rehab program  Write down your questions so you remember to ask them during your visits  © Copyright [employer] 2022 Information is for End User's use only and may not be sold, redistributed or otherwise used for commercial purposes  All illustrations and images included in CareNotes® are the copyrighted property of [employer]  or [facility] Th[facility] Arguelles   The above information is an  only  It is not intended as medical advice for individual conditions or treatments  Talk to your doctor, nurse or pharmacist before following any medical regimen to see if it is safe and effective for you  COPD (Chronic Obstructive Pulmonary Disease)   WHAT YOU NEED TO KNOW:   What is COPD? COPD (chronic obstructive pulmonary disease) is a lung disease that causes breathing problems  COPD usually develops from years of irritation and inflammation in your lungs  Obstructive means airflow is blocked  This limits airflow out of your lungs  Smoking, breathing in pollution, genetics, or a history of lung infections can increase your risk for COPD  What are the signs and symptoms of COPD? Shortness of breath    A dry cough    Coughing fits that bring up mucus from your lungs    Wheezing and chest tightness    How is COPD treated? Healthcare providers will work with you to create a care plan  The plan will contain goals defined by you and your providers  It will include steps to help you reach your goals within a specific time frame   The plan may change over time as your goals and needs change  The following may be included in your plan:  Medicines  may be used to open your airway or decrease swelling and inflammation in your lungs  Antibiotics may be given for up to 5 days to treat a bacterial infection during an exacerbation  Medicines can relieve certain kinds of symptoms  A short-acting medicine relieves symptoms quickly  This may be called rescue medicine  A long-acting medicine controls or prevents symptoms  This may be called maintenance medicine  Your care plan will have directions for when to take each kind of medicine  Your healthcare provider will give you more information about the medicines you are given and how to use them safely  Extra oxygen  may help you breathe easier and feel more alert if you have severe COPD  Do not increase your oxygen levels without speaking to your doctor first     Surgery  is sometimes done if all other treatments have failed  A lung reduction is surgery to remove part of your damaged lung  A lung transplant is the replacement of your lung with a donor lung  What can I do to help make breathing easier? Use pursed-lip breathing any time you feel short of breath  Take a deep breath in through your nose  Slowly breathe out through your mouth with your lips pursed  Try to take 2 times as long to breathe out as to breathe in  This helps you get rid of as much air from your lungs as possible  You can also practice this breathing pattern while you bend, lift, climb stairs, or exercise  It slows down your breathing and helps move more air in and out of your lungs  Avoid anything that makes your symptoms worse  Stay out of high altitudes and places with high humidity  Stay inside, or cover your mouth and nose with a scarf when you are outside in cold weather  Stay inside on days when air pollution or pollen counts are high   Do not use aerosol sprays such as deodorant, bug spray, and hairspray  Exercise as directed  Your healthcare provider may recommend at least 20 minutes of exercise each day to help increase your energy and decrease shortness of breath  Talk to your provider about the best exercise plan for you  How can I manage COPD and help prevent exacerbations? COPD is a serious condition that gets worse over time  A COPD exacerbation means your symptoms suddenly get worse  It is important to prevent exacerbations  An exacerbation can cause more lung damage  COPD cannot be cured, but you can take action to feel better and prevent exacerbations:  Do not smoke  Nicotine and other chemicals in cigarettes and cigars can cause lung damage and make your COPD worse  Ask your healthcare provider for information if you currently smoke and need help to quit  E-cigarettes or smokeless tobacco still contain nicotine  Talk to your healthcare provider before you use these products  Avoid secondhand smoke  This is smoke another person exhales  Even if you have never smoked or have quit, it is important to avoid secondhand smoke  This smoke can also cause lung damage or trigger an exacerbation  Go to pulmonary rehabilitation (rehab) if directed  Rehab is a program run by specialists who help you learn to manage COPD  Examples include a pulmonologist (lung specialist), dietitian, or exercise therapist  The specialists will help you make a plan to avoid triggers that cause an exacerbation  Take your medicines as directed  Refill your medicines before you are out so that you do not miss a dose  Ask your healthcare provider if you have any questions on how to take your medicines  Protect yourself from germs  Germs can get into your lungs and cause an infection  An infection in your lungs can create more mucus and make it harder to breathe  An infection can also create swelling in your airway and prevent air from getting in   You can decrease your risk for infection by doing the following:         Wash your hands often with soap and water  Carry germ-killing gel with you  You can use the gel to clean your hands when soap and water are not available  Do not touch your eyes, nose, or mouth unless you have washed your hands first     Always cover your mouth when you cough  Cough into a tissue or your shirtsleeve so you do not spread germs from your hands  Try to avoid people who have a cold or the flu  If you are sick, stay away from others as much as possible  Ask about vaccines you may need  Influenza (the flu), pneumonia, and COVID-19 can become life-threatening for a person who has COPD  Get a yearly flu vaccine as soon as recommended, usually in September or October  The pneumonia vaccine may be given every 5 years, or as directed  COVID-19 vaccines are available in shots given in 1 or 2 doses  Your healthcare provider can tell you if you should also get other vaccines, and when to get them  Drink liquids as directed  You may need to drink more liquid than usual  Liquid will help to keep your air passages moist and help you cough up mucus  Ask how much liquid to drink each day and which liquids are best for you  Call your local emergency number (911 in the [address] [facility]) if:   You feel lightheaded, short of breath, and have chest pain  When should I seek immediate care? You cough up blood  You are confused, dizzy, or feel faint  Your arm or leg feels warm, tender, and painful  It may look swollen and red  When should I call my doctor? You have increased shortness of breath  You need more medicine than usual to control your symptoms  You are coughing or wheezing more than usual     You are coughing up more mucus, or it has a new color or odor  You gain more than 3 pounds in a week  You have a fever, a runny or stuffy nose, and a sore throat, or other cold or flu symptoms  Your skin, lips, or nails start to turn blue      You have swelling in your legs or ankles  You are very tired or weak for more than a day  You notice changes in your mood, or changes in your ability to think or concentrate  You have questions or concerns about your condition or care  CARE AGREEMENT:   You have the right to help plan your care  Learn about your health condition and how it may be treated  Discuss treatment options with your healthcare providers to decide what care you want to receive  You always have the right to refuse treatment  The above information is an  only  It is not intended as medical advice for individual conditions or treatments  Talk to your doctor, nurse or pharmacist before following any medical regimen to see if it is safe and effective for you  © Copyright [employer] 2022 Information is for End User's use only and may not be sold, redistributed or otherwise used for commercial purposes  All illustrations and images included in CareNotes® are the copyrighted property of A D A M , Inc  or [facility] Thais Arguelles   COPD: Breathing Exercises   AMBULATORY CARE:   Breathing exercises  may help you manage symptoms of COPD, such as shortness of breath  The exercises strengthen the muscles you use to breathe  They may also help you get air easier when you are having trouble breathing  Your healthcare provider will tell you how often to do these exercises  Deep breathing and coughing  can decrease your risk for a lung infection:  Take a deep breath and hold it for as long as you can  Let the air out and then cough strongly  Deep breaths help open your airway  You may be given an incentive spirometer to help you take deep breaths  Put the plastic piece in your mouth and take a slow, deep breath  Then let the air out and cough  Repeat these steps 10 times every hour  Pursed-lip breathing  can be helpful before you start an activity or any time you feel short of breath:  Breathe in through your nose   Use the muscles in your abdomen to help fill your lungs with air  Slowly breathe out through your mouth with your lips slightly puckered  You should make a quiet hissing sound as you breathe out  Try to take 2 times as long to breathe out as to breathe in  This helps you get rid of as much air from your lungs as possible  Repeat this exercise several times  When you are used to doing pursed-lip breathing, you can do it any time you need more air  Diaphragmatic breathing  will help strengthen the muscles you use to breathe:  Place one hand just below your ribs  Place your other hand in the middle of your chest over your breastbone  Breathe in slowly through your nose, as deeply as you can  Breathe out slowly through pursed lips  As you breathe out, tighten the muscles just below your ribs  Use your hand to gently push in and up while you tighten the muscles  Diaphragmatic breathing takes practice  Slowly increase the amount of time you spend during each practice session  © Copyright [employer] 2022 Information is for End User's use only and may not be sold, redistributed or otherwise used for commercial purposes  All illustrations and images included in CareNotes® are the copyrighted property of A D A M , Inc  or [address] [place] [address]  The above information is an  only  It is not intended as medical advice for individual conditions or treatments  Talk to your doctor, nurse or pharmacist before following any medical regimen to see if it is safe and effective for you  Chest Wall Pain   WHAT YOU NEED TO KNOW:   What do I need to know about chest wall pain? Chest wall pain may be caused by problems with the muscles, cartilage, or bones of the chest wall  Chest wall pain may also be caused by pain that spreads to your chest from another part of your body  The pain may be aching, severe, dull, or sharp  It may come and go, or it may be constant   The pain may be worse when you move in certain ways, breathe deeply, or cough  What causes chest wall pain? Conditions that affect the joints or cartilage of the chest wall, such as arthritis or costochondritis    Strain or injury of the chest wall muscles     Fractures of the ribs or vertebrae (bones in your spine)    Herniation of the discs in the upper or middle section of your spine     Shingles    How is chest wall pain diagnosed? Your healthcare provider will ask you to describe your pain  Tell him when the pain started, what type of pain it is, and what makes it better or worse  He will also ask if you have any other symptoms  He will examine your chest  He may also ask you to move your arms in different directions to see how it affects your pain  Ask your healthcare provider about these and other tests you may need:  A chest x-ray  may show the cause of your chest wall pain  You may need more than one x-ray  An MRI  takes pictures of your chest or spine to show the cause of your chest wall pain  You may be given contrast liquid to help the pictures show up better  Tell a healthcare provider if you have ever had an allergic reaction to contrast liquid  Do not enter the MRI room with anything metal  Metal can cause serious injury  Tell a healthcare provider if you have any metal in or on your body  How is chest wall pain treated? Treatment depends on the cause of your chest wall pain  You may need any of the following:  NSAIDs , such as ibuprofen, help decrease swelling, pain, and fever  This medicine is available with or without a doctor's order  NSAIDs can cause stomach bleeding or kidney problems in certain people  If you take blood thinner medicine, always ask your healthcare provider if NSAIDs are safe for you  Always read the medicine label and follow directions  Acetaminophen  decreases pain  It is available without a doctor's order  Ask how much to take and how often to take it  Follow directions   Acetaminophen can cause liver damage if not taken correctly  A cream  may be applied to your chest to decrease pain  Apply heat  on your chest for 20 to 30 minutes every 2 hours for as many days as directed  Heat helps decrease pain and muscle spasms  Apply ice  on your chest for 15 to 20 minutes every hour or as directed  Use an ice pack, or put crushed ice in a plastic bag  Cover it with a towel  Ice helps prevent tissue damage and decreases swelling and pain  Call 911 if:   You have any of the following signs of a heart attack:      Squeezing, pressure, or pain in your chest    You may  also have any of the following:     Discomfort or pain in your back, neck, jaw, stomach, or arm    Shortness of breath    Nausea or vomiting    Lightheadedness or a sudden cold sweat      When should I seek immediate care? You have severe pain  When should I contact my healthcare provider? You develop a rash  You have other new symptoms  Your pain does not improve, even with treatment  You have questions or concerns about your condition or care  CARE AGREEMENT:   You have the right to help plan your care  Learn about your health condition and how it may be treated  Discuss treatment options with your healthcare providers to decide what care you want to receive  You always have the right to refuse treatment  The above information is an  only  It is not intended as medical advice for individual conditions or treatments  Talk to your doctor, nurse or pharmacist before following any medical regimen to see if it is safe and effective for you  © Copyright [employer] 2022 Information is for End User's use only and may not be sold, redistributed or otherwise used for commercial purposes   All illustrations and images included in CareNotes® are the copyrighted property of [employer]  or [address] [place] [address]

## 2022-05-23 NOTE — PROGRESS NOTES
BMI Counseling: Body mass index is 32 07 kg/m²  The BMI is above normal  Nutrition recommendations include decreasing portion sizes, encouraging healthy choices of fruits and vegetables, decreasing fast food intake, consuming healthier snacks, limiting drinks that contain sugar, moderation in carbohydrate intake, increasing intake of lean protein, reducing intake of saturated and trans fat and reducing intake of cholesterol  Exercise recommendations include vigorous physical activity 75 minutes/week, exercising 3-5 times per week, obtaining a gym membership and strength training exercises  No pharmacotherapy was ordered  Rationale for BMI follow-up plan is due to patient being overweight or obese  Assessment/Plan:         Problem List Items Addressed This Visit        Respiratory    Chronic obstructive pulmonary disease ([facility] [address] )     Patient has decreased smoking to half pack per day  Instructed on tobacco cessation  Patient has a follow-up appoint with pulmonary coming up  Currently maintained on Advair 250-50 mcg 2 puffs daily, albuterol HFA 90 mcg every 4 to 6 hours as needed for shortness of breath or wheezing  Allergic rhinitis     Patient currently not using Flonase causes her to have nose bleeds  Currently taking Zyrtec 10 mg p o  Daily  Cardiovascular and Mediastinum    Essential hypertension     Blood pressure currently 120/70  Patient on lisinopril hydrochlorothiazide 10-12 5 mg p o  Daily  To maintain low-sodium diet  Moderate exercise encouraged 3-5 times per week at least 75 minutes cardiovascular activity  Recheck BP next visit  Other    Tobacco use     Patient is smoking less than a pack per day  She has decreased her smoking at this time  Tobacco cessation counseling provided  Medication age refused at this time  Cough     Cough is resolved at this time  Prednisone completed at this time  Recent chest x-ray within normal limits    CHEST    INDICATION:   cough, sob      COMPARISON:  10/17/2017      EXAM PERFORMED/VIEWS:  XR CHEST PORTABLE  Images:  1     FINDINGS:     Cardiomediastinal silhouette appears unremarkable      No focal airspace consolidation or effusions  No pneumothorax      Degenerative changes and dextroscoliosis of the midthoracic spine      IMPRESSION:     No focal airspace consolidation               Screening for cervical cancer     Referral to gyn for cervical cancer screening  Relevant Orders    Ambulatory referral to Obstetrics / Gynecology    Chest heaviness - Primary     EKG ordered at this time  Sinus bradycardia with sinus arrhythmia noted in the 40s  Patient has a right bundle-branch block  Patient reports that she has a cardiac history 20 years prior  Indicates that she does have some prior tricuspid and mitral valvular issues, but unsure if she has regurgitation or prolapse  Currently referred to cardiology for evaluation  Relevant Orders    POCT ECG (Completed)    Ambulatory Referral to Cardiology            Subjective:      Patient ID: Hue Schmidt is a 63 y o  female  Patient is a 63year old femael that reports her cough and upper respiratory symptoms have resolved  She still feels fatigues, and reports chest heaviness like an elephant is sititng on her chest that had started 3 weeks prior  Prior Cardiac episode in her 42's and reports prior valvular disorders of tricubid and mitral valve        The following portions of the patient's history were reviewed and updated as appropriate:   Past Medical History:  She has a past medical history of Allergic, Allergy to sulfa drugs, Anxiety, Chronic back pain, COPD (chronic obstructive pulmonary disease) ([address] ), Depression, Enlarged heart, GERD (gastroesophageal reflux disease), Headache, Heart disease, Heart murmur, Heart valve disorder, HTN (hypertension), Hyperglycemia, Hyperlipidemia, Hypothyroidism, Insomnia, Irritable bowel syndrome with diarrhea, Kidney stones, Migraines, MVP (mitral valve prolapse), Myxomatous mitral valve, Seasonal allergies, Sickle cell trait ([facility] [address] ), Stage 3b chronic kidney disease ([facility] [address] ), Thalassemia carrier, Tobacco user, Urinary incontinence, and Vitamin D deficiency  ,  _______________________________________________________________________  Medical Problems:  does not have any pertinent problems on file ,  _______________________________________________________________________  Past Surgical History:   has a past surgical history that includes  section (); Cholecystectomy (); TONSILLECTOMY; and ALVEOPLASTY (09/15/2016)  ,  _______________________________________________________________________  Family History:  family history includes Brain cancer in her mother; Heart attack in her father and son; Heart disease in her father and maternal grandmother; Kidney cancer in her mother; Kidney disease in her mother and sister; Lung cancer in her mother; Other in her father and mother; Sickle cell trait in her sister; Stroke in her father ,  _______________________________________________________________________  Social History:   reports that she has been smoking cigarettes  She has been smoking about 1 00 pack per day  She has never used smokeless tobacco  She reports current alcohol use  She reports that she does not use drugs  ,  _______________________________________________________________________  Allergies:  is allergic to clindamycin and sulfa antibiotics     _______________________________________________________________________  Current Outpatient Medications   Medication Sig Dispense Refill    albuterol (PROVENTIL HFA,VENTOLIN HFA) 90 mcg/act inhaler Inhale 2 puffs every 4 (four) hours as needed for wheezing or shortness of breath 18 g 0    atorvastatin (LIPITOR) 20 mg tablet TAKE 1 TABLET (20 MG TOTAL) BY MOUTH DAILY 90 tablet 1    cetirizine (ZyrTEC) 10 mg tablet Take 1 tablet (10 mg total) by mouth in the morning  30 tablet 2    fluticasone-salmeterol (Advair) 250-50 mcg/dose inhaler Inhale 1 puff  in the morning and 1 puff in the evening  Rinse mouth after use    60 blister 5    levothyroxine 200 mcg tablet TAKE 1 TABLET (200 MCG TOTAL) BY MOUTH DAILY 90 tablet 1    lisinopril-hydrochlorothiazide (PRINZIDE,ZESTORETIC) 10-12 5 MG per tablet TAKE 1 TABLET BY MOUTH DAILY 90 tablet 1    predniSONE 20 mg tablet Take 1 tablet (20 mg total) by mouth in the morning  7 tablet 0     No current facility-administered medications for this visit      _______________________________________________________________________  Review of Systems   Constitutional: Negative for activity change, appetite change, chills, fatigue, fever and unexpected weight change  HENT: Negative for congestion, ear discharge, ear pain, nosebleeds, postnasal drip, rhinorrhea, sinus pressure, sinus pain, sneezing, sore throat and voice change  Eyes: Negative for pain, redness and visual disturbance  Respiratory: Negative for cough, chest tightness, shortness of breath and wheezing  Cardiovascular: Negative for chest pain and palpitations  Gastrointestinal: Negative for abdominal distention, abdominal pain, constipation, diarrhea, nausea and vomiting  Endocrine: Negative  Genitourinary: Negative for difficulty urinating, dysuria, flank pain, frequency, hematuria and urgency  Musculoskeletal: Negative for arthralgias and myalgias  Skin: Negative  Allergic/Immunologic: Negative  Neurological: Negative  Hematological: Negative  Psychiatric/Behavioral: Negative  Objective:  Vitals:    05/24/22 1307   BP: 120/70   BP Location: Left arm   Patient Position: Sitting   Cuff Size: Adult   Pulse: 75   Resp: 20   Temp: (!) 97 1 °F (36 2 °C)   TempSrc: Temporal   SpO2: 97%   Weight: 80 8 kg (178 lb 3 2 oz)   Height: 5' 2 5" (1 588 m)     Body mass index is 32 07 kg/m²  Physical Exam  Vitals and nursing note reviewed  Constitutional:       Appearance: Normal appearance  She is well-developed  She is obese  HENT:      Head: Normocephalic and atraumatic  Right Ear: Tympanic membrane, ear canal and external ear normal       Left Ear: Tympanic membrane, ear canal and external ear normal       Nose: Nose normal  No congestion or rhinorrhea  Mouth/Throat:      Mouth: Mucous membranes are moist       Pharynx: No oropharyngeal exudate or posterior oropharyngeal erythema  Eyes:      Extraocular Movements: Extraocular movements intact  Conjunctiva/sclera: Conjunctivae normal       Pupils: Pupils are equal, round, and reactive to light  Cardiovascular:      Rate and Rhythm: Normal rate and regular rhythm  Pulses: Normal pulses  Heart sounds: Normal heart sounds  No murmur heard  Pulmonary:      Effort: Pulmonary effort is normal       Breath sounds: Normal breath sounds  Abdominal:      General: Bowel sounds are normal       Palpations: Abdomen is soft  Musculoskeletal:         General: Normal range of motion  Cervical back: Normal range of motion  Skin:     General: Skin is warm  Capillary Refill: Capillary refill takes less than 2 seconds  Neurological:      General: No focal deficit present  Mental Status: She is alert and oriented to person, place, and time     Psychiatric:         Mood and Affect: Mood normal          Behavior: Behavior normal

## 2022-05-24 ENCOUNTER — OFFICE VISIT (OUTPATIENT)
Dept: FAMILY MEDICINE CLINIC | Facility: CLINIC | Age: 62
End: 2022-05-24
Payer: MEDICARE

## 2022-05-24 ENCOUNTER — APPOINTMENT (OUTPATIENT)
Dept: LAB | Facility: HOSPITAL | Age: 62
End: 2022-05-24
Attending: INTERNAL MEDICINE
Payer: MEDICARE

## 2022-05-24 VITALS
HEIGHT: 63 IN | DIASTOLIC BLOOD PRESSURE: 70 MMHG | OXYGEN SATURATION: 97 % | SYSTOLIC BLOOD PRESSURE: 120 MMHG | RESPIRATION RATE: 20 BRPM | TEMPERATURE: 97.1 F | BODY MASS INDEX: 31.57 KG/M2 | WEIGHT: 178.2 LBS | HEART RATE: 75 BPM

## 2022-05-24 DIAGNOSIS — J44.9 CHRONIC OBSTRUCTIVE PULMONARY DISEASE, UNSPECIFIED COPD TYPE (HCC): ICD-10-CM

## 2022-05-24 DIAGNOSIS — Z72.0 TOBACCO USE: ICD-10-CM

## 2022-05-24 DIAGNOSIS — E78.5 HYPERLIPIDEMIA, UNSPECIFIED HYPERLIPIDEMIA TYPE: ICD-10-CM

## 2022-05-24 DIAGNOSIS — Z12.4 SCREENING FOR CERVICAL CANCER: ICD-10-CM

## 2022-05-24 DIAGNOSIS — E03.9 HYPOTHYROIDISM, UNSPECIFIED TYPE: ICD-10-CM

## 2022-05-24 DIAGNOSIS — I10 ESSENTIAL HYPERTENSION: ICD-10-CM

## 2022-05-24 DIAGNOSIS — R05.9 COUGH: ICD-10-CM

## 2022-05-24 DIAGNOSIS — J30.9 ALLERGIC RHINITIS, UNSPECIFIED SEASONALITY, UNSPECIFIED TRIGGER: ICD-10-CM

## 2022-05-24 DIAGNOSIS — R07.89 CHEST HEAVINESS: Primary | ICD-10-CM

## 2022-05-24 LAB
ALBUMIN SERPL BCP-MCNC: 4.1 G/DL (ref 3.5–5)
ALP SERPL-CCNC: 67 U/L (ref 34–104)
ALT SERPL W P-5'-P-CCNC: 10 U/L (ref 7–52)
ANION GAP SERPL CALCULATED.3IONS-SCNC: 9 MMOL/L (ref 4–13)
AST SERPL W P-5'-P-CCNC: 8 U/L (ref 13–39)
BASOPHILS # BLD AUTO: 0.05 THOUSANDS/ΜL (ref 0–0.1)
BASOPHILS NFR BLD AUTO: 1 % (ref 0–1)
BILIRUB SERPL-MCNC: 0.55 MG/DL (ref 0.2–1)
BUN SERPL-MCNC: 25 MG/DL (ref 5–25)
CALCIUM SERPL-MCNC: 9.9 MG/DL (ref 8.4–10.2)
CHLORIDE SERPL-SCNC: 108 MMOL/L (ref 96–108)
CHOLEST SERPL-MCNC: 170 MG/DL
CO2 SERPL-SCNC: 20 MMOL/L (ref 21–32)
CREAT SERPL-MCNC: 1.48 MG/DL (ref 0.6–1.3)
EOSINOPHIL # BLD AUTO: 0.22 THOUSAND/ΜL (ref 0–0.61)
EOSINOPHIL NFR BLD AUTO: 2 % (ref 0–6)
ERYTHROCYTE [DISTWIDTH] IN BLOOD BY AUTOMATED COUNT: 13.2 % (ref 11.6–15.1)
GFR SERPL CREATININE-BSD FRML MDRD: 38 ML/MIN/1.73SQ M
GLUCOSE P FAST SERPL-MCNC: 110 MG/DL (ref 65–99)
HCT VFR BLD AUTO: 38.6 % (ref 34.8–46.1)
HDLC SERPL-MCNC: 35 MG/DL
HGB BLD-MCNC: 13.9 G/DL (ref 11.5–15.4)
IMM GRANULOCYTES # BLD AUTO: 0.06 THOUSAND/UL (ref 0–0.2)
IMM GRANULOCYTES NFR BLD AUTO: 1 % (ref 0–2)
LDLC SERPL CALC-MCNC: 104 MG/DL (ref 0–100)
LYMPHOCYTES # BLD AUTO: 3.61 THOUSANDS/ΜL (ref 0.6–4.47)
LYMPHOCYTES NFR BLD AUTO: 33 % (ref 14–44)
MCH RBC QN AUTO: 32.5 PG (ref 26.8–34.3)
MCHC RBC AUTO-ENTMCNC: 36 G/DL (ref 31.4–37.4)
MCV RBC AUTO: 90 FL (ref 82–98)
MONOCYTES # BLD AUTO: 0.82 THOUSAND/ΜL (ref 0.17–1.22)
MONOCYTES NFR BLD AUTO: 8 % (ref 4–12)
NEUTROPHILS # BLD AUTO: 6.14 THOUSANDS/ΜL (ref 1.85–7.62)
NEUTS SEG NFR BLD AUTO: 55 % (ref 43–75)
NONHDLC SERPL-MCNC: 135 MG/DL
NRBC BLD AUTO-RTO: 0 /100 WBCS
PLATELET # BLD AUTO: 446 THOUSANDS/UL (ref 149–390)
PMV BLD AUTO: 9.6 FL (ref 8.9–12.7)
POTASSIUM SERPL-SCNC: 3.5 MMOL/L (ref 3.5–5.3)
PROT SERPL-MCNC: 7 G/DL (ref 6.4–8.4)
RBC # BLD AUTO: 4.28 MILLION/UL (ref 3.81–5.12)
SODIUM SERPL-SCNC: 137 MMOL/L (ref 135–147)
TRIGL SERPL-MCNC: 154 MG/DL
TSH SERPL DL<=0.05 MIU/L-ACNC: 0.01 UIU/ML (ref 0.45–4.5)
WBC # BLD AUTO: 10.9 THOUSAND/UL (ref 4.31–10.16)

## 2022-05-24 PROCEDURE — 80061 LIPID PANEL: CPT

## 2022-05-24 PROCEDURE — 99215 OFFICE O/P EST HI 40 MIN: CPT | Performed by: NURSE PRACTITIONER

## 2022-05-24 PROCEDURE — 85025 COMPLETE CBC W/AUTO DIFF WBC: CPT

## 2022-05-24 PROCEDURE — 84443 ASSAY THYROID STIM HORMONE: CPT

## 2022-05-24 PROCEDURE — 36415 COLL VENOUS BLD VENIPUNCTURE: CPT

## 2022-05-24 PROCEDURE — 93000 ELECTROCARDIOGRAM COMPLETE: CPT | Performed by: NURSE PRACTITIONER

## 2022-05-24 PROCEDURE — 80053 COMPREHEN METABOLIC PANEL: CPT

## 2022-05-24 NOTE — ASSESSMENT & PLAN NOTE
Blood pressure currently 120/70  Patient on lisinopril hydrochlorothiazide 10-12 5 mg p o  Daily  To maintain low-sodium diet  Moderate exercise encouraged 3-5 times per week at least 75 minutes cardiovascular activity  Recheck BP next visit

## 2022-05-24 NOTE — ASSESSMENT & PLAN NOTE
EKG ordered at this time  Sinus bradycardia with sinus arrhythmia noted in the 40s  Patient has a right bundle-branch block  Patient reports that she has a cardiac history 20 years prior  Indicates that she does have some prior tricuspid and mitral valvular issues, but unsure if she has regurgitation or prolapse  Currently referred to cardiology for evaluation

## 2022-05-24 NOTE — ASSESSMENT & PLAN NOTE
Patient currently not using Flonase causes her to have nose bleeds  Currently taking Zyrtec 10 mg p o  Daily

## 2022-05-24 NOTE — ASSESSMENT & PLAN NOTE
Cough is resolved at this time  Prednisone completed at this time  Recent chest x-ray within normal limits  CHEST      INDICATION:   cough, sob      COMPARISON:  10/17/2017      EXAM PERFORMED/VIEWS:  XR CHEST PORTABLE  Images:  1     FINDINGS:     Cardiomediastinal silhouette appears unremarkable      No focal airspace consolidation or effusions    No pneumothorax      Degenerative changes and dextroscoliosis of the midthoracic spine      IMPRESSION:     No focal airspace consolidation

## 2022-05-24 NOTE — ASSESSMENT & PLAN NOTE
Patient has decreased smoking to half pack per day  Instructed on tobacco cessation  Patient has a follow-up appoint with pulmonary coming up  Currently maintained on Advair 250-50 mcg 2 puffs daily, albuterol HFA 90 mcg every 4 to 6 hours as needed for shortness of breath or wheezing

## 2022-05-24 NOTE — ASSESSMENT & PLAN NOTE
Patient is smoking less than a pack per day  She has decreased her smoking at this time  Tobacco cessation counseling provided  Medication age refused at this time

## 2022-05-25 DIAGNOSIS — E03.9 HYPOTHYROIDISM, UNSPECIFIED TYPE: Primary | ICD-10-CM

## 2022-05-25 RX ORDER — LEVOTHYROXINE SODIUM 0.15 MG/1
150 TABLET ORAL DAILY
Qty: 30 TABLET | Refills: 5 | Status: SHIPPED | OUTPATIENT
Start: 2022-05-25 | End: 2022-06-02 | Stop reason: SDUPTHER

## 2022-06-02 ENCOUNTER — TELEPHONE (OUTPATIENT)
Dept: FAMILY MEDICINE CLINIC | Facility: CLINIC | Age: 62
End: 2022-06-02

## 2022-06-02 DIAGNOSIS — I10 ESSENTIAL HYPERTENSION: ICD-10-CM

## 2022-06-02 DIAGNOSIS — E03.9 HYPOTHYROIDISM, UNSPECIFIED TYPE: ICD-10-CM

## 2022-06-02 DIAGNOSIS — J30.9 ALLERGIC RHINITIS, UNSPECIFIED SEASONALITY, UNSPECIFIED TRIGGER: ICD-10-CM

## 2022-06-02 RX ORDER — CETIRIZINE HYDROCHLORIDE 10 MG/1
10 TABLET ORAL DAILY
Qty: 30 TABLET | Refills: 5 | Status: SHIPPED | OUTPATIENT
Start: 2022-06-02

## 2022-06-02 RX ORDER — LISINOPRIL AND HYDROCHLOROTHIAZIDE 12.5; 1 MG/1; MG/1
1 TABLET ORAL DAILY
Qty: 30 TABLET | Refills: 5 | Status: SHIPPED | OUTPATIENT
Start: 2022-06-02 | End: 2022-07-08

## 2022-06-02 RX ORDER — LEVOTHYROXINE SODIUM 0.15 MG/1
150 TABLET ORAL DAILY
Qty: 30 TABLET | Refills: 5 | Status: SHIPPED | OUTPATIENT
Start: 2022-06-02 | End: 2022-07-08

## 2022-06-02 NOTE — TELEPHONE ENCOUNTER
Needs refill  Levothyroxine 150mcg  1 daily   #30 with refills    Lisinopril HCTZ 10-12 5mg  1 tablet daily   #30 with refills    Cetrizine 10mg  1 daily   #30 with refills

## 2022-07-08 DIAGNOSIS — E78.5 HYPERLIPIDEMIA, UNSPECIFIED HYPERLIPIDEMIA TYPE: ICD-10-CM

## 2022-07-08 DIAGNOSIS — I10 ESSENTIAL HYPERTENSION: ICD-10-CM

## 2022-07-08 DIAGNOSIS — E03.9 HYPOTHYROIDISM, UNSPECIFIED TYPE: ICD-10-CM

## 2022-07-08 RX ORDER — LEVOTHYROXINE SODIUM 0.15 MG/1
TABLET ORAL
Qty: 90 TABLET | Refills: 5 | Status: SHIPPED | OUTPATIENT
Start: 2022-07-08

## 2022-07-08 RX ORDER — ATORVASTATIN CALCIUM 20 MG/1
20 TABLET, FILM COATED ORAL DAILY
Qty: 90 TABLET | Refills: 1 | Status: SHIPPED | OUTPATIENT
Start: 2022-07-08

## 2022-07-08 RX ORDER — LISINOPRIL AND HYDROCHLOROTHIAZIDE 12.5; 1 MG/1; MG/1
1 TABLET ORAL DAILY
Qty: 90 TABLET | Refills: 1 | Status: SHIPPED | OUTPATIENT
Start: 2022-07-08

## 2022-08-15 DIAGNOSIS — J30.9 ALLERGIC RHINITIS, UNSPECIFIED SEASONALITY, UNSPECIFIED TRIGGER: ICD-10-CM

## 2022-08-15 RX ORDER — CETIRIZINE HYDROCHLORIDE 10 MG/1
TABLET ORAL
Qty: 30 TABLET | Refills: 2 | Status: SHIPPED | OUTPATIENT
Start: 2022-08-15

## 2022-10-10 ENCOUNTER — CONSULT (OUTPATIENT)
Dept: PULMONOLOGY | Facility: CLINIC | Age: 62
End: 2022-10-10
Payer: MEDICARE

## 2022-10-10 VITALS
HEIGHT: 63 IN | OXYGEN SATURATION: 99 % | TEMPERATURE: 98.9 F | WEIGHT: 172 LBS | BODY MASS INDEX: 30.48 KG/M2 | HEART RATE: 59 BPM | SYSTOLIC BLOOD PRESSURE: 158 MMHG | RESPIRATION RATE: 18 BRPM | DIASTOLIC BLOOD PRESSURE: 90 MMHG

## 2022-10-10 DIAGNOSIS — Z72.0 TOBACCO USE: ICD-10-CM

## 2022-10-10 DIAGNOSIS — J44.9 CHRONIC OBSTRUCTIVE PULMONARY DISEASE, UNSPECIFIED COPD TYPE (HCC): Primary | ICD-10-CM

## 2022-10-10 PROBLEM — J10.1 TYPE A INFLUENZA: Status: RESOLVED | Noted: 2022-05-17 | Resolved: 2022-10-10

## 2022-10-10 PROBLEM — Z12.4 SCREENING FOR CERVICAL CANCER: Status: RESOLVED | Noted: 2022-05-23 | Resolved: 2022-10-10

## 2022-10-10 PROBLEM — Z09 FOLLOW-UP EXAMINATION: Status: RESOLVED | Noted: 2022-05-17 | Resolved: 2022-10-10

## 2022-10-10 PROCEDURE — 94618 PULMONARY STRESS TESTING: CPT | Performed by: INTERNAL MEDICINE

## 2022-10-10 PROCEDURE — 99244 OFF/OP CNSLTJ NEW/EST MOD 40: CPT | Performed by: INTERNAL MEDICINE

## 2022-10-10 RX ORDER — ALBUTEROL SULFATE 2.5 MG/3ML
2.5 SOLUTION RESPIRATORY (INHALATION) EVERY 6 HOURS PRN
Qty: 180 ML | Refills: 3 | Status: SHIPPED | OUTPATIENT
Start: 2022-10-10

## 2022-10-10 RX ORDER — TIOTROPIUM BROMIDE AND OLODATEROL 3.124; 2.736 UG/1; UG/1
2 SPRAY, METERED RESPIRATORY (INHALATION) DAILY
Qty: 4 G | Refills: 0 | Status: SHIPPED | COMMUNITY
Start: 2022-10-10

## 2022-10-10 NOTE — ASSESSMENT & PLAN NOTE
Social History     Tobacco Use   Smoking Status Current Every Day Smoker   • Packs/day: 1 00   • Years: 52 00   • Pack years: 52 00   • Types: Cigarettes   Smokeless Tobacco Never Used   Tobacco Comment    pt used to smoke 4 packs a day for 30 years     · Patient continues to smoke 1 pack/day, smoked 4 packs/day until 2016  · Patient has tried quitting multiple times and with multiple adjuncts but has never had lasting success  · Patient stated she would like to quit but has "realistic" expectations of herself and believes quitting is not feasible  · Patient declined lung cancer screening and smoking cessation aids

## 2022-10-10 NOTE — ASSESSMENT & PLAN NOTE
· Patient states her most concerning symptoms are shortness of breath, dry cough, and exercise intolerance  · No recent hospitalizations for COPD exacerbation, was treated in ED in April for flu/COPD overlap and d/c home  · Patient states she is unable to walk up hills, and must rest after 4 stairs   Patient states "several minutes" needed to regain her wind   · Patient using Advair as prescribed and rescue inhaler 2x daily with good results, but requested switch to nebulizer  · Patient additionally states she gets "bronchitis" 3x/yearly but has not been treated with abx for this  · Patient given 6-minute walk test in office and given order for complete PFTs  · Patient given 2-week trial of Stiolto inhaler and instructed on use, will call office and report efficacy  · Patient given order for nebulizer supplies and nebulizer

## 2022-10-10 NOTE — PROGRESS NOTES
Pulmonary Follow Up Note   Geni Rodriguez 63 y o  female MRN: [id number]  10/10/2022    Assessment:    Tobacco use  Social History     Tobacco Use   Smoking Status Current Every Day Smoker   • Packs/day: 1 00   • Years: 52 00   • Pack years: 52 00   • Types: Cigarettes   Smokeless Tobacco Never Used   Tobacco Comment    pt used to smoke 4 packs a day for 30 years     · Patient continues to smoke 1 pack/day, smoked 4 packs/day until 2016  · Patient has tried quitting multiple times and with multiple adjuncts but has never had lasting success  · Patient stated she would like to quit but has "realistic" expectations of herself and believes quitting is not feasible  · Patient declined lung cancer screening and smoking cessation aids     Chronic obstructive pulmonary disease ([place] [address] )  · Patient states her most concerning symptoms are shortness of breath, dry cough, and exercise intolerance  · No recent hospitalizations for COPD exacerbation, was treated in ED in April for flu/COPD overlap and d/c home  · Patient states she is unable to walk up hills, and must rest after 4 stairs  Patient states "several minutes" needed to regain her wind   · Patient using Advair as prescribed and rescue inhaler 2x daily with good results, but requested switch to nebulizer  · Patient additionally states she gets "bronchitis" 3x/yearly but has not been treated with abx for this  · Patient given 6-minute walk test in office and given order for complete PFTs  · Patient given 2-week trial of Stiolto inhaler and instructed on use, will call office and report efficacy  · Patient given order for nebulizer supplies and nebulizer    Plan:    Diagnoses and all orders for this visit:    Chronic obstructive pulmonary disease, unspecified COPD type ([place] [address] )  -     Ambulatory Referral to Pulmonology  -     POCT 6 minute walk  -     Complete PFT with post bronchodilator; Future  -     tiotropium-olodaterol (Stiolto Respimat) 2 5-2 5 MCG/ACT inhaler;  Inhale 2 puffs daily  -     Nebulizer  -     Nebulizer Supplies  -     albuterol (2 5 mg/3 mL) 0 083 % nebulizer solution; Take 3 mL (2 5 mg total) by nebulization every 6 (six) hours as needed for wheezing or shortness of breath    Tobacco use        Return in about 3 months (around 1/10/2023)  History of Present Illness   HPI:  Christopher Dias is a 63 y o  female with PMH of COPD, seasonal allergies, and >50pack/year hx of smoking who presents for initial assessment of COPD  Patient states she cannot climb hills and must rest after 4 stairs  Patient states she has frequent episodes of coughing with scant mucus production, sometimes severe enough she will vomit  Patient additionally states she is frequently fatigued, small tasks are tiring for her  She denies hospitalizations for COPD exacerbation in past or bronchitis requiring antibiotic treatment  Denies recent exacerbations but states she struggles with high heat or humidity  No pets at home, chemical exposure, or other inciting factors  Review of Systems   Constitutional: Negative for chills and fatigue  HENT: Negative for congestion, dental problem, ear pain and postnasal drip  Eyes: Negative for visual disturbance  Respiratory: Positive for cough and shortness of breath  Negative for chest tightness and wheezing  Cardiovascular: Negative for chest pain and leg swelling  Gastrointestinal: Negative for abdominal pain, constipation and diarrhea  Endocrine: Negative for polydipsia and polyphagia  Genitourinary: Negative for dysuria and pelvic pain  Musculoskeletal: Negative for back pain and neck pain  Skin: Negative for rash and wound  Neurological: Negative for dizziness, weakness and headaches       Historical Information   Past Medical History:   Diagnosis Date   • Allergic    • Allergy to sulfa drugs    • Anxiety    • Chronic back pain    • COPD (chronic obstructive pulmonary disease) (HCC)    • Depression    • Enlarged heart    • GERD (gastroesophageal reflux disease)    • Headache    • Heart disease    • Heart murmur    • Heart valve disorder    • HTN (hypertension)    • Hyperglycemia    • Hyperlipidemia    • Hypothyroidism    • Insomnia    • Irritable bowel syndrome with diarrhea    • Kidney stones    • Migraines    • MVP (mitral valve prolapse)    • Myxomatous mitral valve     mild lvh ef 60%   • Seasonal allergies    • Sickle cell trait (HCC)    • Stage 3b chronic kidney disease (HCC)    • Thalassemia carrier    • Tobacco user    • Urinary incontinence    • Vitamin D deficiency      Past Surgical History:   Procedure Laterality Date   • ALVEOPLASTY  09/15/2016    with extraction   •  SECTION     • CHOLECYSTECTOMY     • TONSILLECTOMY      age 13     Family History   Problem Relation Age of Onset   • Brain cancer Mother    • Lung cancer Mother    • Kidney cancer Mother    • Kidney disease Mother    • Other Mother         4 PPD Heavy Smoker   • Stroke Father         at 50   • Heart disease Father    • Heart attack Father         x 6   • Other Father         4 PPD Heavy Smoker   • Sickle cell trait Sister    • Kidney disease Sister    • Heart attack Son         Diana Gamboa @ age 44   • Heart disease Maternal Grandmother        Meds/Allergies     Current Outpatient Medications:   •  albuterol (2 5 mg/3 mL) 0 083 % nebulizer solution, Take 3 mL (2 5 mg total) by nebulization every 6 (six) hours as needed for wheezing or shortness of breath, Disp: 180 mL, Rfl: 3  •  albuterol (PROVENTIL HFA,VENTOLIN HFA) 90 mcg/act inhaler, Inhale 2 puffs every 4 (four) hours as needed for wheezing or shortness of breath, Disp: 18 g, Rfl: 0  •  atorvastatin (LIPITOR) 20 mg tablet, TAKE 1 TABLET (20 MG TOTAL) BY MOUTH DAILY, Disp: 90 tablet, Rfl: 1  •  cetirizine (ZyrTEC) 10 mg tablet, TAKE 1 TABLET (10 MG TOTAL) BY MOUTH IN THE MORNING , Disp: 30 tablet, Rfl: 2  •  fluticasone-salmeterol (Advair) 250-50 mcg/dose inhaler, Inhale 1 puff  in the morning and 1 puff in the evening  Rinse mouth after use   , Disp: 60 blister, Rfl: 5  •  levothyroxine 150 mcg tablet, TAKE 1 TABLET (150 MCG TOTAL) BY MOUTH IN THE MORNING , Disp: 90 tablet, Rfl: 5  •  lisinopril-hydrochlorothiazide (PRINZIDE,ZESTORETIC) 10-12 5 MG per tablet, TAKE 1 TABLET BY MOUTH DAILY, Disp: 90 tablet, Rfl: 1  •  tiotropium-olodaterol (Stiolto Respimat) 2 5-2 5 MCG/ACT inhaler, Inhale 2 puffs daily, Disp: 4 g, Rfl: 0  Allergies   Allergen Reactions   • Clindamycin Hives   • Sulfa Antibiotics Itching       Vitals: Blood pressure 158/90, pulse 59, temperature 98 9 °F (37 2 °C), temperature source Tympanic, resp  rate 18, height 5' 2 5" (1 588 m), weight 78 kg (172 lb), SpO2 99 %  Body mass index is 30 96 kg/m²  Oxygen Therapy  SpO2: 99 %    Bariatric Assessment    Flowsheet Row Most Recent Value   Assessment    Baseline Vitals    /90   SpO2 98   HR 70   Exercise Minute 1    SpO2 98   HR 82   Laps 3   Exercise Minute 2    SpO2 98   HR 82   Laps 3   Exercise Minute 3    SpO2 97   HR 80   Laps 3   Exercise Minute 4    SpO2 95   HR 78   Laps 3   Exercise Minute 5    SpO2 95   HR 84   Laps 3   Exercise Minute 6    SpO2 92   HR 82   Laps 3   Six Minute Walk Data    1 Lap (m) = 18 m   Total Distance (m) 324   Distance calculated (ft) 1062 99 feet          Physical Exam  Physical Exam  Constitutional:       Appearance: Normal appearance  HENT:      Head: Normocephalic and atraumatic  Right Ear: External ear normal       Left Ear: External ear normal       Mouth/Throat:      Mouth: Mucous membranes are moist       Pharynx: Oropharynx is clear  Eyes:      Extraocular Movements: Extraocular movements intact  Conjunctiva/sclera: Conjunctivae normal    Neck:      Vascular: No carotid bruit  Cardiovascular:      Rate and Rhythm: Normal rate and regular rhythm  Pulmonary:      Effort: Pulmonary effort is normal  No respiratory distress  Breath sounds: No stridor  No wheezing or rales  Comments: Poor air entry, reduced airflow apex to base  No other adventitious sounds on auscultation  Abdominal:      General: Abdomen is flat  Bowel sounds are normal       Palpations: Abdomen is soft  Musculoskeletal:         General: Normal range of motion  Cervical back: Normal range of motion and neck supple  Skin:     General: Skin is warm and dry  Capillary Refill: Capillary refill takes less than 2 seconds  Neurological:      Mental Status: She is alert  Labs: I have personally reviewed pertinent lab results  Lab Results   Component Value Date    WBC 10 90 (H) 05/24/2022    HGB 13 9 05/24/2022    HCT 38 6 05/24/2022    MCV 90 05/24/2022     (H) 05/24/2022     Lab Results   Component Value Date    GLUCOSE 112 03/25/2014    CALCIUM 9 9 05/24/2022     03/25/2014    K 3 5 05/24/2022    CO2 20 (L) 05/24/2022     05/24/2022    BUN 25 05/24/2022    CREATININE 1 48 (H) 05/24/2022     No results found for: IGE  Lab Results   Component Value Date    ALT 10 05/24/2022    AST 8 (L) 05/24/2022    ALKPHOS 67 05/24/2022    BILITOT 0 3 03/25/2014       Imaging and other studies: I have personally reviewed pertinent reports  and I have personally reviewed pertinent films in PACS    EKG, Pathology, and Other Studies: I have personally reviewed pertinent reports  ELMER Arreola [facility] Residency, [facility]

## 2022-10-11 PROBLEM — R05.9 COUGH: Status: RESOLVED | Noted: 2022-05-17 | Resolved: 2022-10-11

## 2022-10-19 ENCOUNTER — HOSPITAL ENCOUNTER (OUTPATIENT)
Dept: PULMONOLOGY | Facility: HOSPITAL | Age: 62
Discharge: HOME | End: 2022-10-19
Attending: INTERNAL MEDICINE
Payer: MEDICARE

## 2022-10-19 DIAGNOSIS — J44.9 CHRONIC OBSTRUCTIVE PULMONARY DISEASE, UNSPECIFIED COPD TYPE (HCC): ICD-10-CM

## 2022-10-19 PROCEDURE — 94060 EVALUATION OF WHEEZING: CPT | Performed by: INTERNAL MEDICINE

## 2022-10-19 PROCEDURE — 94729 DIFFUSING CAPACITY: CPT

## 2022-10-19 PROCEDURE — 94760 N-INVAS EAR/PLS OXIMETRY 1: CPT

## 2022-10-19 PROCEDURE — 94060 EVALUATION OF WHEEZING: CPT

## 2022-10-19 PROCEDURE — 94726 PLETHYSMOGRAPHY LUNG VOLUMES: CPT

## 2022-10-19 PROCEDURE — 94729 DIFFUSING CAPACITY: CPT | Performed by: INTERNAL MEDICINE

## 2022-10-19 PROCEDURE — 94726 PLETHYSMOGRAPHY LUNG VOLUMES: CPT | Performed by: INTERNAL MEDICINE

## 2022-10-19 RX ORDER — ALBUTEROL SULFATE 2.5 MG/3ML
2.5 SOLUTION RESPIRATORY (INHALATION) ONCE
Status: COMPLETED | OUTPATIENT
Start: 2022-10-19 | End: 2022-10-19

## 2022-10-19 RX ADMIN — ALBUTEROL SULFATE 2.5 MG: 2.5 SOLUTION RESPIRATORY (INHALATION) at 15:33

## 2022-11-03 DIAGNOSIS — E78.5 HYPERLIPIDEMIA, UNSPECIFIED HYPERLIPIDEMIA TYPE: ICD-10-CM

## 2022-11-03 DIAGNOSIS — I10 ESSENTIAL HYPERTENSION: ICD-10-CM

## 2022-11-03 RX ORDER — ATORVASTATIN CALCIUM 20 MG/1
20 TABLET, FILM COATED ORAL DAILY
Qty: 90 TABLET | Refills: 1 | Status: SHIPPED | OUTPATIENT
Start: 2022-11-03

## 2022-11-03 RX ORDER — LISINOPRIL AND HYDROCHLOROTHIAZIDE 12.5; 1 MG/1; MG/1
1 TABLET ORAL DAILY
Qty: 90 TABLET | Refills: 1 | Status: SHIPPED | OUTPATIENT
Start: 2022-11-03

## 2022-12-27 ENCOUNTER — TELEPHONE (OUTPATIENT)
Dept: PULMONOLOGY | Facility: CLINIC | Age: 62
End: 2022-12-27

## 2022-12-27 NOTE — TELEPHONE ENCOUNTER
Lm for patient to schedule a follow up appointment, due in January, with Dr Helga Villalobos at our [facility]

## 2023-06-05 ENCOUNTER — OFFICE VISIT (OUTPATIENT)
Dept: FAMILY MEDICINE CLINIC | Facility: CLINIC | Age: 63
End: 2023-06-05
Payer: MEDICARE

## 2023-06-05 VITALS
RESPIRATION RATE: 16 BRPM | SYSTOLIC BLOOD PRESSURE: 122 MMHG | TEMPERATURE: 96.9 F | HEART RATE: 68 BPM | OXYGEN SATURATION: 98 % | BODY MASS INDEX: 31.18 KG/M2 | DIASTOLIC BLOOD PRESSURE: 80 MMHG | WEIGHT: 176 LBS | HEIGHT: 63 IN

## 2023-06-05 DIAGNOSIS — Z12.31 BREAST CANCER SCREENING BY MAMMOGRAM: ICD-10-CM

## 2023-06-05 DIAGNOSIS — Z00.00 ANNUAL PHYSICAL EXAM: Primary | ICD-10-CM

## 2023-06-05 DIAGNOSIS — I10 ESSENTIAL HYPERTENSION: ICD-10-CM

## 2023-06-05 DIAGNOSIS — E78.5 HYPERLIPIDEMIA, UNSPECIFIED HYPERLIPIDEMIA TYPE: ICD-10-CM

## 2023-06-05 DIAGNOSIS — Z53.20 LUNG CANCER SCREENING DECLINED BY PATIENT: ICD-10-CM

## 2023-06-05 DIAGNOSIS — Z28.21 PNEUMOCOCCAL VACCINE REFUSED: ICD-10-CM

## 2023-06-05 DIAGNOSIS — Z53.20 COLON CANCER SCREENING DECLINED: ICD-10-CM

## 2023-06-05 DIAGNOSIS — E03.9 HYPOTHYROIDISM, UNSPECIFIED TYPE: ICD-10-CM

## 2023-06-05 PROCEDURE — 99396 PREV VISIT EST AGE 40-64: CPT

## 2023-06-05 NOTE — ASSESSMENT & PLAN NOTE
CPE done, routine labs ordered, script for mammogram provided  Declined lung ca and colo rectal cancer screenings, pneumococcal vaccine and referral to Gyn for cervical cancer screening   Discussed healthy eating options and regular exercise and importance of regular dental exam

## 2023-06-05 NOTE — ASSESSMENT & PLAN NOTE
Symptoms stable on albuterol and Stiolto Respimat inhalers  Bobby Runner with Pulmonology and due for routine f/u

## 2023-06-05 NOTE — PROGRESS NOTES
[address] [facility]    NAME: Mary Saez  AGE: 66 y o  SEX: female  : 1960     DATE: 2023     Assessment and Plan:     Problem List Items Addressed This Visit        Endocrine    Hypothyroidism     Recheck TSH currently on levothyroxine 150 mcg  Relevant Orders    TSH, 3rd generation       Cardiovascular and Mediastinum    Essential hypertension     BP stable on lisinopril-HCTZ 10/12 5mg, check CMP  Relevant Orders    Comprehensive metabolic panel       Other    Annual physical exam - Primary     CPE done, routine labs ordered, script for mammogram provided  Declined lung ca and colo rectal cancer screenings, pneumococcal vaccine and referral to Gyn for cervical cancer screening  Discussed healthy eating options and regular exercise and importance of regular dental exam           Relevant Orders    CBC and differential    Comprehensive metabolic panel    Hyperlipidemia     Recheck lipid panel she is on atorvastatin 20 mg daily  Relevant Orders    Lipid panel   Other Visit Diagnoses     Lung cancer screening declined by patient        Colon cancer screening declined        Pneumococcal vaccine refused        Breast cancer screening by mammogram        Relevant Orders    Mammo screening bilateral w 3d & cad    BMI 31 0-31 9,adult              Immunizations and preventive care screenings were discussed with patient today  Appropriate education was printed on patient's after visit summary  Counseling:  Alcohol/drug use: discussed moderation in alcohol intake, the recommendations for healthy alcohol use, and avoidance of illicit drug use  · Exercise: the importance of regular exercise/physical activity was discussed  Recommend exercise 3-5 times per week for at least 30 minutes  BMI Counseling: Body mass index is 31 68 kg/m²   The BMI is above normal  Nutrition recommendations include encouraging healthy choices of fruits and vegetables  Exercise recommendations include exercising 3-5 times per week  Rationale for BMI follow-up plan is due to patient being overweight or obese  Tobacco Cessation Counseling: Tobacco cessation counseling was provided  The patient is sincerely urged to quit consumption of tobacco  She is not ready to quit tobacco  Medication options not discussed  Patient refused medication  Return in 1 year (on 2024), or if symptoms worsen or fail to improve, for Annual physical      Chief Complaint:     Chief Complaint   Patient presents with   • Physical Exam      History of Present Illness:     Adult Annual Physical   Patient here for a comprehensive physical exam  The patient reports no problems  Diet and Physical Activity  · Diet/Nutrition: poor diet, limited junk food, low carb diet and limited fruits/vegetables  · Exercise: no formal exercise  Depression Screening  PHQ-2/9 Depression Screening    Little interest or pleasure in doing things: 1 - several days  Feeling down, depressed, or hopeless: 1 - several days  Trouble falling or staying asleep, or sleeping too much: 0 - not at all  Feeling tired or having little energy: 0 - not at all  Poor appetite or overeatin - not at all  Feeling bad about yourself - or that you are a failure or have let yourself or your family down: 0 - not at all  Trouble concentrating on things, such as reading the newspaper or watching television: 0 - not at all  Moving or speaking so slowly that other people could have noticed  Or the opposite - being so fidgety or restless that you have been moving around a lot more than usual: 0 - not at all  Thoughts that you would be better off dead, or of hurting yourself in some way: 0 - not at all  PHQ-9 Score: 2   PHQ-9 Interpretation: No or Minimal depression        General Health  · Sleep: sleeps well and gets 7-8 hours of sleep on average     · Hearing: decreased - bilateral   · Vision: most recent eye exam >1 year ago and wears glasses  · Dental: no dental visits for >1 year and edentuous  /GYN Health  · Patient is: postmenopausal  · Last menstrual period: menopausal  · Contraceptive method: none  Review of Systems:     Review of Systems   Constitutional: Negative for activity change, appetite change, chills, diaphoresis, fatigue and fever  HENT: Negative for congestion, drooling, ear pain, hearing loss, nosebleeds, postnasal drip, rhinorrhea, sinus pressure, sinus pain, sore throat, trouble swallowing and voice change  Eyes: Negative for photophobia, pain, discharge, redness and visual disturbance  Respiratory: Negative for cough, chest tightness, shortness of breath and wheezing  Cardiovascular: Negative for chest pain, palpitations and leg swelling  Gastrointestinal: Negative for abdominal distention, abdominal pain, blood in stool, constipation, diarrhea, nausea, rectal pain and vomiting  Endocrine: Negative for cold intolerance, heat intolerance, polydipsia, polyphagia and polyuria  Genitourinary: Negative for decreased urine volume, difficulty urinating, dysuria, flank pain, genital sores, hematuria, menstrual problem, pelvic pain, urgency, vaginal discharge and vaginal pain  Musculoskeletal: Negative for arthralgias, back pain, gait problem, myalgias, neck pain and neck stiffness  Skin: Negative for color change, rash and wound  Allergic/Immunologic: Negative for environmental allergies, food allergies and immunocompromised state  Neurological: Negative for dizziness, tremors, seizures, syncope, facial asymmetry, weakness, light-headedness and numbness  Hematological: Negative for adenopathy  Psychiatric/Behavioral: Negative for agitation, behavioral problems, confusion, decreased concentration, dysphoric mood, hallucinations, self-injury, sleep disturbance and suicidal ideas  The patient is not nervous/anxious and is not hyperactive      All other systems reviewed and are negative       Past Medical History:     Past Medical History:   Diagnosis Date   • Allergic    • Allergy to sulfa drugs    • Anxiety    • Chronic back pain    • COPD (chronic obstructive pulmonary disease) ([facility])    • Depression    • Enlarged heart    • GERD (gastroesophageal reflux disease)    • Headache    • Heart disease    • Heart murmur    • Heart valve disorder    • HTN (hypertension)    • Hyperglycemia    • Hyperlipidemia    • Hypothyroidism    • Insomnia    • Irritable bowel syndrome with diarrhea    • Kidney stones    • Migraines    • MVP (mitral valve prolapse)    • Myxomatous mitral valve     mild lvh ef 60%   • Seasonal allergies    • Sickle cell trait ([facility])    • Stage 3b chronic kidney disease ([facility])    • Thalassemia carrier    • Tobacco user    • Urinary incontinence    • Vitamin D deficiency       Past Surgical History:     Past Surgical History:   Procedure Laterality Date   • ALVEOPLASTY  09/15/2016    with extraction   •  SECTION     • CHOLECYSTECTOMY     • TONSILLECTOMY      age 13      Social History:     Social History     Socioeconomic History   • Marital status: Legally      Spouse name: None   • Number of children: None   • Years of education: None   • Highest education level: 12th grade   Occupational History   • None   Tobacco Use   • Smoking status: Every Day     Packs/day: 1 00     Years: 52 00     Total pack years: 52 00     Types: Cigarettes   • Smokeless tobacco: Never   • Tobacco comments:     pt used to smoke 4 packs a day for 30 years   Vaping Use   • Vaping Use: Never used   Substance and Sexual Activity   • Alcohol use: Yes     Comment: social   • Drug use: Never     Comment: Denies-Per Yadira    • Sexual activity: None   Other Topics Concern   • None   Social History Narrative    Most recent tobacco use screenin2019      Do you currently or have you served in the [employer]:   No      Were you activated, into active duty, as a member of the [employer] or as a Reservist:   No      Exercise level:   None      Diet:   Regular      Has smoked since age:   14      Caffeine intake:   Heavy      Guns present in home:   No      Seat belts used routinely:   Yes      Sunscreen used routinely:   No      Smoke alarm in home:   Yes      Advance directive:   No      Live alone or with others:   alone      Are there stairs in your home:   Yes      International travel:   denies      Pets:   No      Deaf or serious difficulty hearing:   No      Blind or serious difficulty seeing:   No      Difficulty concentrating, remembering or making decisions:   Yes      Difficulty walking or climbing stairs:   No      Difficulty dressing or bathing:   No      Difficulty doing errands alone:   No      Social Determinants of Health     Financial Resource Strain: Not on file   Food Insecurity: Not on file   Transportation Needs: Not on file   Physical Activity: Not on file   Stress: Not on file   Social Connections: Not on file   Intimate Partner Violence: Not on file   Housing Stability: Not on file      Family History:     Family History   Problem Relation Age of Onset   • Brain cancer Mother    • Lung cancer Mother    • Kidney cancer Mother    • Kidney disease Mother    • Other Mother         4 PPD Heavy Smoker   • Stroke Father         at 50   • Heart disease Father    • Heart attack Father         x 6   • Other Father         4 PPD Heavy Smoker   • Sickle cell trait Sister    • Kidney disease Sister    • Heart attack Son         [employer] @ age 44   • Heart disease Maternal Grandmother       Current Medications:     Current Outpatient Medications   Medication Sig Dispense Refill   • albuterol (2 5 mg/3 mL) 0 083 % nebulizer solution Take 3 mL (2 5 mg total) by nebulization every 6 (six) hours as needed for wheezing or shortness of breath 180 mL 3   • albuterol (PROVENTIL HFA,VENTOLIN HFA) 90 mcg/act inhaler Inhale 2 puffs every 4 (four) hours as "needed for wheezing or shortness of breath 18 g 0   • atorvastatin (LIPITOR) 20 mg tablet TAKE 1 TABLET (20 MG TOTAL) BY MOUTH DAILY 90 tablet 1   • cetirizine (ZyrTEC) 10 mg tablet TAKE 1 TABLET (10 MG TOTAL) BY MOUTH IN THE MORNING  30 tablet 2   • fluticasone-salmeterol (Advair) 250-50 mcg/dose inhaler Inhale 1 puff  in the morning and 1 puff in the evening  Rinse mouth after use    60 blister 5   • levothyroxine 150 mcg tablet TAKE 1 TABLET (150 MCG TOTAL) BY MOUTH IN THE MORNING  90 tablet 5   • lisinopril-hydrochlorothiazide (PRINZIDE,ZESTORETIC) 10-12 5 MG per tablet TAKE 1 TABLET BY MOUTH DAILY 90 tablet 1   • tiotropium-olodaterol (Stiolto Respimat) 2 5-2 5 MCG/ACT inhaler Inhale 2 puffs daily 4 g 0     No current facility-administered medications for this visit  Allergies: Allergies   Allergen Reactions   • Clindamycin Hives   • Sulfa Antibiotics Itching      Physical Exam:     /80 (BP Location: Left arm, Patient Position: Sitting, Cuff Size: Adult)   Pulse 68   Temp (!) 96 9 °F (36 1 °C) (Tympanic)   Resp 16   Ht 5' 2 5\" (1 588 m)   Wt 79 8 kg (176 lb)   SpO2 98%   BMI 31 68 kg/m²     Physical Exam  Vitals and nursing note reviewed  Constitutional:       General: She is not in acute distress  Appearance: Normal appearance  She is obese  She is not ill-appearing, toxic-appearing or diaphoretic  HENT:      Head: Normocephalic and atraumatic  Right Ear: Tympanic membrane, ear canal and external ear normal  There is no impacted cerumen  Left Ear: Tympanic membrane, ear canal and external ear normal  There is no impacted cerumen  Nose: Nose normal  No congestion or rhinorrhea  Mouth/Throat:      Mouth: Mucous membranes are moist       Pharynx: No oropharyngeal exudate or posterior oropharyngeal erythema  Eyes:      General: No scleral icterus  Right eye: No discharge  Left eye: No discharge        Extraocular Movements: Extraocular movements " intact  Conjunctiva/sclera: Conjunctivae normal       Pupils: Pupils are equal, round, and reactive to light  Neck:      Vascular: No carotid bruit  Cardiovascular:      Rate and Rhythm: Normal rate and regular rhythm  Pulses: Normal pulses  Heart sounds: Normal heart sounds  No murmur heard  Pulmonary:      Effort: Pulmonary effort is normal  No respiratory distress  Breath sounds: Normal breath sounds  No stridor  No wheezing, rhonchi or rales  Chest:      Chest wall: No tenderness  Abdominal:      General: Bowel sounds are normal  There is no distension  Palpations: Abdomen is soft  There is no mass  Tenderness: There is no abdominal tenderness  There is no right CVA tenderness, left CVA tenderness, guarding or rebound  Hernia: No hernia is present  Genitourinary:     Vagina: No vaginal discharge  Musculoskeletal:         General: No swelling, tenderness, deformity or signs of injury  Normal range of motion  Cervical back: Normal range of motion and neck supple  No rigidity or tenderness  Right lower leg: No edema  Left lower leg: No edema  Lymphadenopathy:      Cervical: No cervical adenopathy  Skin:     General: Skin is warm  Capillary Refill: Capillary refill takes less than 2 seconds  Findings: No erythema, lesion or rash  Neurological:      General: No focal deficit present  Mental Status: She is alert and oriented to person, place, and time  Cranial Nerves: No cranial nerve deficit  Sensory: No sensory deficit  Motor: No weakness  Coordination: Coordination normal       Gait: Gait normal       Deep Tendon Reflexes: Reflexes normal    Psychiatric:         Mood and Affect: Mood normal          Behavior: Behavior normal          Thought Content:  Thought content normal          Judgment: Judgment normal           BRUNA Sanchez  [address] [place] [address]

## 2023-06-05 NOTE — ASSESSMENT & PLAN NOTE
Lab Results   Component Value Date    CREATININE 1 48 (H) 05/24/2022    CREATININE 1 48 (H) 09/29/2021    CREATININE 1 33 (H) 03/25/2014    EGFR 38 05/24/2022    EGFR 38 09/29/2021     Repeat CMP currently on lisinopril/HCTZ 10-12 5 mg

## 2023-07-20 DIAGNOSIS — E03.9 HYPOTHYROIDISM, UNSPECIFIED TYPE: ICD-10-CM

## 2023-07-20 RX ORDER — LEVOTHYROXINE SODIUM 0.15 MG/1
TABLET ORAL
Qty: 90 TABLET | Refills: 1 | Status: SHIPPED | OUTPATIENT
Start: 2023-07-20

## 2023-10-19 DIAGNOSIS — I10 ESSENTIAL HYPERTENSION: ICD-10-CM

## 2023-10-19 DIAGNOSIS — E03.9 HYPOTHYROIDISM, UNSPECIFIED TYPE: ICD-10-CM

## 2023-10-19 DIAGNOSIS — E78.5 HYPERLIPIDEMIA, UNSPECIFIED HYPERLIPIDEMIA TYPE: ICD-10-CM

## 2023-10-19 RX ORDER — ATORVASTATIN CALCIUM 20 MG/1
20 TABLET, FILM COATED ORAL DAILY
Qty: 90 TABLET | Refills: 1 | Status: SHIPPED | OUTPATIENT
Start: 2023-10-19

## 2023-10-19 RX ORDER — LEVOTHYROXINE SODIUM 0.15 MG/1
TABLET ORAL
Qty: 90 TABLET | Refills: 1 | Status: SHIPPED | OUTPATIENT
Start: 2023-10-19

## 2023-10-19 RX ORDER — LISINOPRIL AND HYDROCHLOROTHIAZIDE 12.5; 1 MG/1; MG/1
1 TABLET ORAL DAILY
Qty: 90 TABLET | Refills: 1 | Status: SHIPPED | OUTPATIENT
Start: 2023-10-19

## 2024-04-16 DIAGNOSIS — I10 ESSENTIAL HYPERTENSION: ICD-10-CM

## 2024-04-16 DIAGNOSIS — E78.5 HYPERLIPIDEMIA, UNSPECIFIED HYPERLIPIDEMIA TYPE: ICD-10-CM

## 2024-04-16 DIAGNOSIS — E03.9 HYPOTHYROIDISM, UNSPECIFIED TYPE: ICD-10-CM

## 2024-04-17 RX ORDER — ATORVASTATIN CALCIUM 20 MG/1
20 TABLET, FILM COATED ORAL DAILY
Qty: 90 TABLET | Refills: 1 | Status: SHIPPED | OUTPATIENT
Start: 2024-04-17

## 2024-04-17 RX ORDER — LISINOPRIL AND HYDROCHLOROTHIAZIDE 12.5; 1 MG/1; MG/1
1 TABLET ORAL DAILY
Qty: 90 TABLET | Refills: 1 | Status: SHIPPED | OUTPATIENT
Start: 2024-04-17

## 2024-04-17 RX ORDER — LEVOTHYROXINE SODIUM 0.15 MG/1
TABLET ORAL
Qty: 90 TABLET | Refills: 1 | Status: SHIPPED | OUTPATIENT
Start: 2024-04-17

## 2024-06-16 ENCOUNTER — HOSPITAL ENCOUNTER (EMERGENCY)
Facility: HOSPITAL | Age: 64
Discharge: HOME | End: 2024-06-16
Attending: EMERGENCY MEDICINE
Payer: MEDICARE

## 2024-06-16 VITALS
HEART RATE: 72 BPM | TEMPERATURE: 98 F | OXYGEN SATURATION: 98 % | SYSTOLIC BLOOD PRESSURE: 149 MMHG | DIASTOLIC BLOOD PRESSURE: 65 MMHG | RESPIRATION RATE: 18 BRPM

## 2024-06-16 DIAGNOSIS — M54.50 ACUTE RIGHT-SIDED LOW BACK PAIN WITHOUT SCIATICA: Primary | ICD-10-CM

## 2024-06-16 PROCEDURE — 99284 EMERGENCY DEPT VISIT MOD MDM: CPT | Performed by: EMERGENCY MEDICINE

## 2024-06-16 PROCEDURE — 99282 EMERGENCY DEPT VISIT SF MDM: CPT

## 2024-06-16 RX ORDER — PREDNISONE 20 MG/1
40 TABLET ORAL ONCE
Status: COMPLETED | OUTPATIENT
Start: 2024-06-16 | End: 2024-06-16

## 2024-06-16 RX ORDER — METHOCARBAMOL 500 MG/1
500 TABLET, FILM COATED ORAL ONCE
Status: COMPLETED | OUTPATIENT
Start: 2024-06-16 | End: 2024-06-16

## 2024-06-16 RX ORDER — PREDNISONE 20 MG/1
40 TABLET ORAL DAILY
Qty: 8 TABLET | Refills: 0 | Status: SHIPPED | OUTPATIENT
Start: 2024-06-17 | End: 2024-06-16

## 2024-06-16 RX ORDER — METHOCARBAMOL 500 MG/1
500 TABLET, FILM COATED ORAL EVERY 8 HOURS PRN
Qty: 20 TABLET | Refills: 0 | Status: SHIPPED | OUTPATIENT
Start: 2024-06-16

## 2024-06-16 RX ORDER — LIDOCAINE 50 MG/G
1 PATCH TOPICAL ONCE
Status: DISCONTINUED | OUTPATIENT
Start: 2024-06-16 | End: 2024-06-16 | Stop reason: HOSPADM

## 2024-06-16 RX ORDER — PREDNISONE 20 MG/1
40 TABLET ORAL DAILY
Qty: 8 TABLET | Refills: 0 | Status: SHIPPED | OUTPATIENT
Start: 2024-06-17 | End: 2024-06-21

## 2024-06-16 RX ORDER — METHOCARBAMOL 500 MG/1
500 TABLET, FILM COATED ORAL EVERY 8 HOURS PRN
Qty: 20 TABLET | Refills: 0 | Status: SHIPPED | OUTPATIENT
Start: 2024-06-16 | End: 2024-06-16

## 2024-06-16 RX ADMIN — METHOCARBAMOL 500 MG: 500 TABLET ORAL at 17:01

## 2024-06-16 RX ADMIN — LIDOCAINE 5% 1 PATCH: 700 PATCH TOPICAL at 17:01

## 2024-06-16 RX ADMIN — PREDNISONE 40 MG: 20 TABLET ORAL at 17:01

## 2024-06-16 NOTE — ED PROVIDER NOTES
History  Chief Complaint   Patient presents with    Back Pain     Patient reports to ED c/o lower back pain. Hx of herniated discs but reports it has not been problematic for 6 years. Patient felt back go out while trying to stand up from sitting.      64 year old female presents for evaluation of lumbar back pain which began this afternoon when she tried to get out of bed.  Patient states she was in her usual state of health prior to going to bed.  No falls or injuries in the past 2 weeks and the last time she engaged in any heavy lifting was 2 weeks ago.  She sat on the edge of her bed and then attempted to stand with sudden onset of lumbar back pain radiating to the right buttock.  Pain does not radiate down the legs.  No numbness, tingling or weakness.  No bowel or bladder complaints.  She has had to use a cane to ambulate due to the pain.  Patient states she does not typically need to use an assistive device.  Patient took 325 mg acetaminophen at noon with no improvement.  Patient states she had similar pain 6 years ago from a herniated disc.      Back Pain      Prior to Admission Medications   Prescriptions Last Dose Informant Patient Reported? Taking?   albuterol (2.5 mg/3 mL) 0.083 % nebulizer solution   No No   Sig: Take 3 mL (2.5 mg total) by nebulization every 6 (six) hours as needed for wheezing or shortness of breath   albuterol (PROVENTIL HFA,VENTOLIN HFA) 90 mcg/act inhaler   No No   Sig: Inhale 2 puffs every 4 (four) hours as needed for wheezing or shortness of breath   atorvastatin (LIPITOR) 20 mg tablet   No No   Sig: TAKE 1 TABLET (20 MG TOTAL) BY MOUTH DAILY   cetirizine (ZyrTEC) 10 mg tablet   No No   Sig: TAKE 1 TABLET (10 MG TOTAL) BY MOUTH IN THE MORNING.   fluticasone-salmeterol (Advair) 250-50 mcg/dose inhaler   No No   Sig: Inhale 1 puff  in the morning and 1 puff in the evening. Rinse mouth after use. .   levothyroxine 150 mcg tablet   No No   Sig: TAKE 1 TABLET (150 MCG TOTAL) BY MOUTH  IN THE MORNING.   lisinopril-hydrochlorothiazide (PRINZIDE,ZESTORETIC) 10-12.5 MG per tablet   No No   Sig: TAKE 1 TABLET BY MOUTH DAILY   tiotropium-olodaterol (Stiolto Respimat) 2.5-2.5 MCG/ACT inhaler   No No   Sig: Inhale 2 puffs daily      Facility-Administered Medications: None       Past Medical History:   Diagnosis Date    Allergic     Allergy to sulfa drugs     Anxiety     Chronic back pain     COPD (chronic obstructive pulmonary disease) (HCC)     Depression     Enlarged heart     GERD (gastroesophageal reflux disease)     Headache     Heart disease     Heart murmur     Heart valve disorder     HTN (hypertension)     Hyperglycemia     Hyperlipidemia     Hypothyroidism     Insomnia     Irritable bowel syndrome with diarrhea     Kidney stones     Migraines     MVP (mitral valve prolapse)     Myxomatous mitral valve     mild lvh ef 60%    Seasonal allergies     Sickle cell trait (Former[facility])     Stage 3b chronic kidney disease (Former[facility])     Thalassemia carrier     Tobacco user     Urinary incontinence     Vitamin D deficiency        Past Surgical History:   Procedure Laterality Date    ALVEOPLASTY  09/15/2016    with extraction     SECTION      CHOLECYSTECTOMY      TONSILLECTOMY      age 7       Family History   Problem Relation Age of Onset    Brain cancer Mother     Lung cancer Mother     Kidney cancer Mother     Kidney disease Mother     Other Mother         4 PPD Heavy Smoker    Stroke Father         at 48    Heart disease Father     Heart attack Father         x 6    Other Father         4 PPD Heavy Smoker    Sickle cell trait Sister     Kidney disease Sister     Heart attack Son         Passed Away @ age 39    Heart disease Maternal Grandmother      I have reviewed and agree with the history as documented.    E-Cigarette/Vaping    E-Cigarette Use Never User      E-Cigarette/Vaping Substances    Nicotine No     THC No     CBD No     Flavoring No     Other No     Unknown No      Social History      Tobacco Use    Smoking status: Every Day     Current packs/day: 1.00     Average packs/day: 1 pack/day for 52.0 years (52.0 ttl pk-yrs)     Types: Cigarettes    Smokeless tobacco: Never    Tobacco comments:     pt used to smoke 4 packs a day for 30 years   Vaping Use    Vaping status: Never Used   Substance Use Topics    Alcohol use: Yes     Comment: social    Drug use: Never     Comment: Denies-Per [place]        Review of Systems   Musculoskeletal:  Positive for back pain.       Physical Exam  Physical Exam  Vitals and nursing note reviewed.   HENT:      Head: Normocephalic and atraumatic.   Cardiovascular:      Rate and Rhythm: Normal rate and regular rhythm.      Pulses: Normal pulses.   Pulmonary:      Effort: Pulmonary effort is normal. No respiratory distress.   Abdominal:      General: There is no distension.      Palpations: Abdomen is soft.      Tenderness: There is no abdominal tenderness.   Musculoskeletal:      Comments: No midline C/T/L spine tenderness. No step offs or deformities.   5/5 strength dorsiflexion and plantar flexion as well as knee flexion and extension bilaterally.  Normal sensation.   Skin:     General: Skin is warm and dry.   Neurological:      Mental Status: She is alert.         Vital Signs  ED Triage Vitals   Temperature Pulse Respirations Blood Pressure SpO2   06/16/24 1649 06/16/24 1648 06/16/24 1648 06/16/24 1649 06/16/24 1648   98 °F (36.7 °C) 74 18 (!) 183/77 99 %      Temp Source Heart Rate Source Patient Position - Orthostatic VS BP Location FiO2 (%)   06/16/24 1649 06/16/24 1648 06/16/24 1648 06/16/24 1648 --   Temporal Monitor Sitting Right arm       Pain Score       --                  Vitals:    06/16/24 1648 06/16/24 1649 06/16/24 1700   BP:  (!) 183/77 149/65   Pulse: 74  72   Patient Position - Orthostatic VS: Sitting           Visual Acuity      ED Medications  Medications   lidocaine (LIDODERM) 5 % patch 1 patch (1 patch Topical Medication Applied 6/16/24 1701)    predniSONE tablet 40 mg (40 mg Oral Given 6/16/24 1701)   methocarbamol (ROBAXIN) tablet 500 mg (500 mg Oral Given 6/16/24 1701)       Diagnostic Studies  Results Reviewed       None                   No orders to display              Procedures  Procedures         ED Course                               SBIRT 22yo+      Flowsheet Row Most Recent Value   Initial Alcohol Screen: US AUDIT-C     1. How often do you have a drink containing alcohol? 0 Filed at: 06/16/2024 1656   2. How many drinks containing alcohol do you have on a typical day you are drinking?  0 Filed at: 06/16/2024 1656   3b. FEMALE Any Age, or MALE 65+: How often do you have 4 or more drinks on one occassion? 0 Filed at: 06/16/2024 1656   Audit-C Score 0 Filed at: 06/16/2024 1656   GUSTAVO: How many times in the past year have you...    Used an illegal drug or used a prescription medication for non-medical reasons? Never Filed at: 06/16/2024 1656                      Medical Decision Making  64 year old female presents for evaluation of atraumatic back pain.  No current signs to suggest cauda equina.  Patient unable to take NSAIDs due to CKD.  Prednisone burst as there is some radiation to the right gluteus.  Robaxin PRN.  Proper dosing of tylenol discussed.  Comprehensive spine referral placed.  Discussed return precautions with patient.    Risk  Prescription drug management.             Disposition  Final diagnoses:   Acute right-sided low back pain without sciatica     Time reflects when diagnosis was documented in both MDM as applicable and the Disposition within this note       Time User Action Codes Description Comment    6/16/2024  5:05 PM Marcy Cisse Add [M54.50] Acute right-sided low back pain without sciatica           ED Disposition       ED Disposition   Discharge    Condition   Stable    Date/Time   Sun Jun 16, 2024 1727    Comment   Keely Padgett discharge to home/self care.                   Follow-up Information       Follow up  With Specialties Details Why Contact Info Additional Information    Meka Ordoñez MD Internal Medicine, Pediatrics  As needed [address] [facility]  [address] [place] [id number] [phone]        [facility] Emergency Department Emergency Medicine Go to  If symptoms worsen, numbness, weakness, difficulty going to the bathroom [address] [facility] [address] [phone] [facility] Emergency Department, [address] [place], [place] [address]    [facility] Comprehensive Spine Program Physical Therapy Call in 1 day for further evaluation and management of your back pain [phone]            Discharge Medication List as of 6/16/2024  5:27 PM        START taking these medications    Details   methocarbamol (ROBAXIN) 500 mg tablet Take 1 tablet (500 mg total) by mouth every 8 (eight) hours as needed for muscle spasms, Starting Sun 6/16/2024, Normal      predniSONE 20 mg tablet Take 2 tablets (40 mg total) by mouth daily for 4 days Do not start before June 17, 2024., Starting Mon 6/17/2024, Until Fri 6/21/2024, Normal           CONTINUE these medications which have NOT CHANGED    Details   albuterol (2.5 mg/3 mL) 0.083 % nebulizer solution Take 3 mL (2.5 mg total) by nebulization every 6 (six) hours as needed for wheezing or shortness of breath, Starting Mon 10/10/2022, Normal      albuterol (PROVENTIL HFA,VENTOLIN HFA) 90 mcg/act inhaler Inhale 2 puffs every 4 (four) hours as needed for wheezing or shortness of breath, Starting Tue 5/17/2022, Normal      atorvastatin (LIPITOR) 20 mg tablet TAKE 1 TABLET (20 MG TOTAL) BY MOUTH DAILY, Starting Wed 4/17/2024, Normal      cetirizine (ZyrTEC) 10 mg tablet TAKE 1 TABLET (10 MG TOTAL) BY MOUTH IN THE MORNING., Normal      fluticasone-salmeterol (Advair) 250-50 mcg/dose inhaler Inhale 1 puff  in the morning and 1 puff in the evening. Rinse mouth after use. ., Starting Tue  5/17/2022, Normal      levothyroxine 150 mcg tablet TAKE 1 TABLET (150 MCG TOTAL) BY MOUTH IN THE MORNING., Normal      lisinopril-hydrochlorothiazide (PRINZIDE,ZESTORETIC) 10-12.5 MG per tablet TAKE 1 TABLET BY MOUTH DAILY, Starting Wed 4/17/2024, Normal      tiotropium-olodaterol (Stiolto Respimat) 2.5-2.5 MCG/ACT inhaler Inhale 2 puffs daily, Starting Mon 10/10/2022, Sample                 PDMP Review         Value Time User    PDMP Reviewed  Yes 1/14/2021 10:09 AM Isabel Goldman DO            ED Provider  Electronically Signed by             Marcy Cisse MD  06/16/24 1508

## 2024-06-16 NOTE — DISCHARGE INSTRUCTIONS
Take 1000 mg of acetaminophen (Tylenol) every 8 hours as needed for pain.  Lidocaine patches are available over-the-counter can be found in the back pain aisle of most pharmacies.  Look for 4% lidocaine in the list of the active ingredients.  These patches can be placed for 12 hours.  After 12 hours, discard the patch.  The next patch can be placed 12 hours later.

## 2024-06-18 ENCOUNTER — TELEPHONE (OUTPATIENT)
Dept: PHYSICAL THERAPY | Facility: OTHER | Age: 64
End: 2024-06-18

## 2024-06-18 ENCOUNTER — NURSE TRIAGE (OUTPATIENT)
Dept: PHYSICAL THERAPY | Facility: OTHER | Age: 64
End: 2024-06-18

## 2024-06-18 DIAGNOSIS — M54.50 ACUTE RIGHT-SIDED LOW BACK PAIN, UNSPECIFIED WHETHER SCIATICA PRESENT: Primary | ICD-10-CM

## 2024-06-18 NOTE — TELEPHONE ENCOUNTER
This nurse called the patient to proceed with the triage initiated earlier today.   Message left stating reason for call, [facility] contact information, and hours of operation.    Encouraged patient to CB if she would like to complete the triage and referral process.    Referral closed per protocol and will await possible call-back from the patient.

## 2024-06-18 NOTE — TELEPHONE ENCOUNTER
Additional Information   Negative: Has the patient had unexplained weight loss?   Negative: Does the patient have a fever?   Negative: Is the patient experiencing urine retention?   Negative: Is the patient experiencing acute drop foot or paralysis?   Negative: Has the patient experienced major trauma? (fall from height, high speed collision, direct blow to spine) and is also experiencing nausea, light-headedness, or loss of consciousness?   Negative: Is the patient experiencing blood in sputum?   Negative: Is this a chronic condition?    Protocols used: [facility] Protocol  Patient returned call to .  Nurse completed triage and NO RF s/s present. Referral entered for the  site and contact/phone number info given to the patient as well.   Patients information was sent to the preferred site and pt made aware clerical would be calling to schedule the evaluation appointment. Nurse encouraged her to call the site if she does not hear from clerical beforehand. Patient Agreed.    Patient did not voice any additional questions or concerns at this time.   Patient is aware current/past complaints, relevant dx, additional referrals and treatment/options will be discussed at the evaluation/consultation appointment.   Patient is in agreement with plan to be evaluated by the therapist/DPT.     Patient very appreciative of CB and referral placement.   Nurse wished her well and referral closed.

## 2024-06-26 ENCOUNTER — EVALUATION (OUTPATIENT)
Dept: PHYSICAL THERAPY | Facility: CLINIC | Age: 64
End: 2024-06-26
Payer: MEDICARE

## 2024-06-26 VITALS — SYSTOLIC BLOOD PRESSURE: 133 MMHG | DIASTOLIC BLOOD PRESSURE: 87 MMHG | HEART RATE: 60 BPM | TEMPERATURE: 98.2 F

## 2024-06-26 DIAGNOSIS — M54.50 ACUTE RIGHT-SIDED LOW BACK PAIN, UNSPECIFIED WHETHER SCIATICA PRESENT: Primary | ICD-10-CM

## 2024-06-26 PROCEDURE — 97161 PT EVAL LOW COMPLEX 20 MIN: CPT | Performed by: PHYSICAL THERAPIST

## 2024-06-26 PROCEDURE — 97110 THERAPEUTIC EXERCISES: CPT | Performed by: PHYSICAL THERAPIST

## 2024-06-26 NOTE — TELEPHONE ENCOUNTER
Additional Information  • Negative: Is this related to a work injury?  • Negative: Is this related to an MVA?  • Negative: Are you currently recieving homecare services?  • Negative: Has the patient had unexplained weight loss?  • Negative: Does the patient have a fever?  • Negative: Is the patient experiencing urine retention?  • Negative: Is the patient experiencing acute drop foot or paralysis?  • Negative: Has the patient experienced major trauma? (fall from height, high speed collision, direct blow to spine) and is also experiencing nausea, light-headedness, or loss of consciousness?  • Negative: Is the patient experiencing blood in sputum?  • Negative: Is this a chronic condition?    Protocols used: [facility] Protocol

## 2024-06-26 NOTE — PROGRESS NOTES
PT Evaluation     Today's date: 2024  Patient name: Keely Padgett  : 1960  MRN: [id number]  Referring provider: Rober Mendoza PT  Dx:   Encounter Diagnosis     ICD-10-CM    1. Acute right-sided low back pain, unspecified whether sciatica present  M54.50 Ambulatory referral to PT spine                     Assessment  Impairments: abnormal or restricted ROM, impaired physical strength, lacks appropriate home exercise program and pain with function  Functional limitations: difficulty with prolonged walking/standing, increased pain in the morning  Symptom irritability: moderate    Assessment details: Keely Padgett is a 64 y.o. female who presents with R acute LBP. Pt has decreased R LE strength and decreased lumbar ROM due to pain. Pt currently has pain with prolonged standing/walking and increased pain first thing in the morning. Pt responding well to flexion based program with increased spasms during extension based activities. Pt would benefit from skilled PT to address the above impairments and to return to pain free function.    Understanding of Dx/Px/POC: good     Prognosis: good    Goals  1. Pt will be independent with HEP upon DC.  2. Pt's lumbar ROM will be WNL and pain free.  3. Pt's R LE strength will be at least 4/5 t/o to allow for prolonged walking.  4. Pt's pain will be no more than 3/10 to allow for return to PLOF.       Plan  Patient would benefit from: skilled physical therapy  Planned modality interventions: low level laser therapy    Planned therapy interventions: joint mobilization, manual therapy, neuromuscular re-education, patient education, therapeutic activities, therapeutic exercise, strengthening and home exercise program    Frequency: 2x week  Duration in weeks: 6  Treatment plan discussed with: patient        Subjective Evaluation    History of Present Illness  Date of onset: 2024  Mechanism of injury: Pt reports that she was sitting on the edge of her bed trying to  put her slippers on when she felt a sharp pain in her LB into her HS. Pt went to the ER and was given Prednisone and a muscle relaxer which helped with her pain. Pt reports that she's 50% improved. Pt reports that her leg pain has subsided. Pt reports that bending feels better but standing increases pain. Pt denies imaging. Pt reports to OP PT.           Not a recurrent problem   Quality of life: good    Patient Goals  Patient goals for therapy: decreased pain    Pain  Current pain ratin  At best pain ratin  At worst pain ratin  Location: R LB  Quality: sharp, discomfort and cramping      Diagnostic Tests  No diagnostic tests performed  Treatments  Previous treatment: medication        Objective     Palpation     Right   Muscle spasm in the quadratus lumborum.   Tenderness of the lumbar paraspinals and quadratus lumborum.     Active Range of Motion     Lumbar   Flexion:  WFL  Extension:  Restriction level: minimal  Left lateral flexion:  WFL  Right lateral flexion:  with pain Restriction level: moderate  Left rotation:  WFL  Right rotation:  with pain Restriction level: minimal  Mechanical Assessment    Cervical      Thoracic    Lying extension: sustained positions  Pain intensity: worse    Lumbar    Sitting flexion: sustained positions  Pain intensity: better    Strength/Myotome Testing     Lumbar   Left   Normal strength    Right Hip   Planes of Motion   Flexion: 3+  Extension: 4  Abduction: 4    Tests     Lumbar     Left   Positive crossed SLR and passive SLR.     Right   Positive crossed SLR and passive SLR.     Right Pelvic Girdle/Sacrum   Positive: active SLR test.     Right Hip   Positive KELSIE and FADIR.              Precautions: none      Manuals             Laser to R lumbar             TPR to R QL if germain             R sciatic nerve glide                          Neuro Re-Ed                                                                                                        Ther Ex         "     R SKTC 10x10\"            Seated flexion x2'            R piriformis stretch review            Seated max opening x10            PPT with ball sq             Pball flexion             Supine SLR             Bridging             Hooklying tband abduction                                                    Ther Activity                                                                                                                          "

## 2024-07-01 ENCOUNTER — OFFICE VISIT (OUTPATIENT)
Dept: PHYSICAL THERAPY | Facility: CLINIC | Age: 64
End: 2024-07-01
Payer: MEDICARE

## 2024-07-01 DIAGNOSIS — M54.50 ACUTE RIGHT-SIDED LOW BACK PAIN, UNSPECIFIED WHETHER SCIATICA PRESENT: Primary | ICD-10-CM

## 2024-07-01 PROCEDURE — 97140 MANUAL THERAPY 1/> REGIONS: CPT

## 2024-07-01 NOTE — PROGRESS NOTES
"Daily Note     Today's date: 2024  Patient name: Keely Padgett  : 1960  MRN: [id number]  Referring provider: Rober Mendoza, TYE  Dx:   Encounter Diagnosis     ICD-10-CM    1. Acute right-sided low back pain, unspecified whether sciatica present  M54.50                      Subjective: \"I am much worse since I was here.\" Notes pain in her R buttock radiating into her knee.  Neck pain began on Thursday. She reports she is only able to germain seated flex, all other exercises causes increased pain.      Objective: See treatment diary below      Assessment: Performed seated flex, as germain, held all other exercises. Added laser to R L-S area, f/b gentle STM to same, both w/pt seated in a chair. Noted relief after tx.Tolerated treatment fair. Will  monitor. Patient would benefit from continued PT to decrease pain, increase flexibility, and LOF.      Plan: Cont per POC.     Precautions: none      Manuals            Laser to R lumbar  4' 10W           TPR to R QL if germain  STM- MO           R sciatic nerve glide                          Neuro Re-Ed                                                                                                        Ther Ex             R SKTC 10x10\" held           Seated flexion x2' 2x30\"           R piriformis stretch review held           Seated max opening x10 held           PPT with ball sq             Pball flexion             Supine SLR             Bridging             Hooklying tband abduction                                                    Ther Activity                                                                                                                          "

## 2024-07-03 ENCOUNTER — APPOINTMENT (OUTPATIENT)
Dept: PHYSICAL THERAPY | Facility: CLINIC | Age: 64
End: 2024-07-03
Payer: MEDICARE

## 2024-07-08 ENCOUNTER — APPOINTMENT (OUTPATIENT)
Dept: PHYSICAL THERAPY | Facility: CLINIC | Age: 64
End: 2024-07-08
Payer: MEDICARE

## 2024-07-10 ENCOUNTER — APPOINTMENT (OUTPATIENT)
Dept: PHYSICAL THERAPY | Facility: CLINIC | Age: 64
End: 2024-07-10
Payer: MEDICARE

## 2024-07-15 ENCOUNTER — OFFICE VISIT (OUTPATIENT)
Dept: FAMILY MEDICINE CLINIC | Facility: CLINIC | Age: 64
End: 2024-07-15
Payer: MEDICARE

## 2024-07-15 VITALS
WEIGHT: 162 LBS | DIASTOLIC BLOOD PRESSURE: 80 MMHG | OXYGEN SATURATION: 99 % | SYSTOLIC BLOOD PRESSURE: 138 MMHG | HEIGHT: 63 IN | BODY MASS INDEX: 28.7 KG/M2 | RESPIRATION RATE: 16 BRPM | TEMPERATURE: 97.3 F | HEART RATE: 56 BPM

## 2024-07-15 DIAGNOSIS — Z53.20 LUNG CANCER SCREENING DECLINED BY PATIENT: ICD-10-CM

## 2024-07-15 DIAGNOSIS — J44.9 CHRONIC OBSTRUCTIVE PULMONARY DISEASE, UNSPECIFIED COPD TYPE (HCC): ICD-10-CM

## 2024-07-15 DIAGNOSIS — E78.5 HYPERLIPIDEMIA, UNSPECIFIED HYPERLIPIDEMIA TYPE: ICD-10-CM

## 2024-07-15 DIAGNOSIS — E03.9 HYPOTHYROIDISM, UNSPECIFIED TYPE: ICD-10-CM

## 2024-07-15 DIAGNOSIS — Z12.31 BREAST CANCER SCREENING BY MAMMOGRAM: ICD-10-CM

## 2024-07-15 DIAGNOSIS — Z53.20 COLON CANCER SCREENING DECLINED: ICD-10-CM

## 2024-07-15 DIAGNOSIS — F32.A DEPRESSION, UNSPECIFIED DEPRESSION TYPE: ICD-10-CM

## 2024-07-15 DIAGNOSIS — N18.30 STAGE 3 CHRONIC KIDNEY DISEASE, UNSPECIFIED WHETHER STAGE 3A OR 3B CKD (HCC): ICD-10-CM

## 2024-07-15 DIAGNOSIS — Z53.20 CERVICAL CANCER SCREENING DECLINED: ICD-10-CM

## 2024-07-15 DIAGNOSIS — Z28.21 PNEUMOCOCCAL VACCINATION DECLINED: ICD-10-CM

## 2024-07-15 DIAGNOSIS — Z72.0 TOBACCO USE: ICD-10-CM

## 2024-07-15 DIAGNOSIS — I10 ESSENTIAL HYPERTENSION: ICD-10-CM

## 2024-07-15 DIAGNOSIS — Z00.00 ANNUAL PHYSICAL EXAM: Primary | ICD-10-CM

## 2024-07-15 PROCEDURE — 99396 PREV VISIT EST AGE 40-64: CPT | Performed by: INTERNAL MEDICINE

## 2024-07-15 PROCEDURE — 99214 OFFICE O/P EST MOD 30 MIN: CPT | Performed by: INTERNAL MEDICINE

## 2024-07-15 RX ORDER — LISINOPRIL AND HYDROCHLOROTHIAZIDE 12.5; 1 MG/1; MG/1
1 TABLET ORAL DAILY
Qty: 90 TABLET | Refills: 3 | Status: SHIPPED | OUTPATIENT
Start: 2024-07-15 | End: 2024-07-22 | Stop reason: SDUPTHER

## 2024-07-15 RX ORDER — LEVOTHYROXINE SODIUM 0.15 MG/1
150 TABLET ORAL DAILY
Qty: 90 TABLET | Refills: 3 | Status: SHIPPED | OUTPATIENT
Start: 2024-07-15 | End: 2024-07-22 | Stop reason: SDUPTHER

## 2024-07-15 RX ORDER — ALBUTEROL SULFATE 2.5 MG/3ML
2.5 SOLUTION RESPIRATORY (INHALATION) EVERY 6 HOURS PRN
Qty: 180 ML | Refills: 3 | Status: SHIPPED | OUTPATIENT
Start: 2024-07-15

## 2024-07-15 NOTE — PROGRESS NOTES
Adult Annual Physical  Name: Keely Padgett      : 1960      MRN: [id number]  Encounter Provider: Meka Ordoñez MD  Encounter Date: 7/15/2024   Encounter department: [facility]    Assessment & Plan   1. Annual physical exam  2. Lung cancer screening declined by patient  3. Colon cancer screening declined  4. Cervical cancer screening declined  5. Chronic obstructive pulmonary disease, unspecified COPD type (HCC)  -     albuterol (2.5 mg/3 mL) 0.083 % nebulizer solution; Take 3 mL (2.5 mg total) by nebulization every 6 (six) hours as needed for wheezing or shortness of breath  6. Hypothyroidism, unspecified type  -     levothyroxine 150 mcg tablet; Take 1 tablet (150 mcg total) by mouth daily  7. Stage 3 chronic kidney disease, unspecified whether stage 3a or 3b CKD (HCC)  8. Tobacco use  9. Depression, unspecified depression type  10. Essential hypertension  -     lisinopril-hydrochlorothiazide (PRINZIDE,ZESTORETIC) 10-12.5 MG per tablet; Take 1 tablet by mouth daily  11. Pneumococcal vaccination declined  12. Hyperlipidemia, unspecified hyperlipidemia type  13. Breast cancer screening by mammogram  -     Mammo screening bilateral w 3d & cad; Future  Keely here for checkup-hx of HTN, COPD, tobacco use,CKD, HL-mammogram screening accepted but other screenings declined -vaccines declined- refilled meds for her  Immunizations and preventive care screenings were discussed with patient today. Appropriate education was printed on patient's after visit summary.    Counseling:  Alcohol/drug use: discussed moderation in alcohol intake, the recommendations for healthy alcohol use, and avoidance of illicit drug use.  Dental Health: discussed importance of regular tooth brushing, flossing, and dental visits.  Exercise: the importance of regular exercise/physical activity was discussed. Recommend exercise 3-5 times per week for at least 30 minutes.       Depression Screening and Follow-up  Plan: Patient was screened for depression during today's encounter. They screened negative with a PHQ-9 score of 0.    Tobacco Cessation Counseling: Tobacco cessation counseling was not provided. The patient is sincerely urged to quit consumption of tobacco. She is not ready to quit tobacco. Medication options discussed. Patient refused medication.         History of Present Illness     Adult Annual Physical:  Patient presents for annual physical.     Diet and Physical Activity:  - Diet/Nutrition: well balanced diet.  - Exercise: walking.    Depression Screening:    - PHQ-9 Score: 0    General Health:  - Sleep: sleeps well.    Review of Systems   Constitutional: Negative.    HENT: Negative.     Respiratory: Negative.     Cardiovascular: Negative.    Gastrointestinal: Negative.    Musculoskeletal: Negative.    Skin: Negative.    Hematological: Negative.    Psychiatric/Behavioral: Negative.       Pertinent Medical History       Medical History Reviewed by provider this encounter:  Tobacco  Allergies  Meds  Problems  Med Hx  Surg Hx  Fam Hx       Past Medical History   Past Medical History:   Diagnosis Date    Allergic     Allergy to sulfa drugs     Anxiety     Chronic back pain     COPD (chronic obstructive pulmonary disease) (HCC)     Depression     Enlarged heart     GERD (gastroesophageal reflux disease)     Headache     Heart disease     Heart murmur     Heart valve disorder     HTN (hypertension)     Hyperglycemia     Hyperlipidemia     Hypothyroidism     Insomnia     Irritable bowel syndrome with diarrhea     Kidney stones     Migraines     MVP (mitral valve prolapse)     Myxomatous mitral valve     mild lvh ef 60%    Seasonal allergies     Sickle cell trait (HCC)     Stage 3b chronic kidney disease (HCC)     Thalassemia carrier     Tobacco user     Urinary incontinence     Vitamin D deficiency      Past Surgical History:   Procedure Laterality Date    ALVEOPLASTY  09/15/2016    with extraction      SECTION      CHOLECYSTECTOMY      TONSILLECTOMY      age 7     Family History   Problem Relation Age of Onset    Brain cancer Mother     Lung cancer Mother     Kidney cancer Mother     Kidney disease Mother     Other Mother         4 PPD Heavy Smoker    Stroke Father         at 48    Heart disease Father     Heart attack Father         x 6    Other Father         4 PPD Heavy Smoker    Sickle cell trait Sister     Kidney disease Sister     Heart attack Son         Passed Away @ age 39    Heart disease Maternal Grandmother      Current Outpatient Medications on File Prior to Visit   Medication Sig Dispense Refill    albuterol (PROVENTIL HFA,VENTOLIN HFA) 90 mcg/act inhaler Inhale 2 puffs every 4 (four) hours as needed for wheezing or shortness of breath 18 g 0    atorvastatin (LIPITOR) 20 mg tablet TAKE 1 TABLET (20 MG TOTAL) BY MOUTH DAILY 90 tablet 1    cetirizine (ZyrTEC) 10 mg tablet TAKE 1 TABLET (10 MG TOTAL) BY MOUTH IN THE MORNING. 30 tablet 2    fluticasone-salmeterol (Advair) 250-50 mcg/dose inhaler Inhale 1 puff  in the morning and 1 puff in the evening. Rinse mouth after use. . 60 blister 5    methocarbamol (ROBAXIN) 500 mg tablet Take 1 tablet (500 mg total) by mouth every 8 (eight) hours as needed for muscle spasms 20 tablet 0    tiotropium-olodaterol (Stiolto Respimat) 2.5-2.5 MCG/ACT inhaler Inhale 2 puffs daily 4 g 0    [DISCONTINUED] albuterol (2.5 mg/3 mL) 0.083 % nebulizer solution Take 3 mL (2.5 mg total) by nebulization every 6 (six) hours as needed for wheezing or shortness of breath 180 mL 3    [DISCONTINUED] levothyroxine 150 mcg tablet TAKE 1 TABLET (150 MCG TOTAL) BY MOUTH IN THE MORNING. 90 tablet 1    [DISCONTINUED] lisinopril-hydrochlorothiazide (PRINZIDE,ZESTORETIC) 10-12.5 MG per tablet TAKE 1 TABLET BY MOUTH DAILY 90 tablet 1     No current facility-administered medications on file prior to visit.     Allergies   Allergen Reactions    Clindamycin Hives    Sulfa  Antibiotics Itching      Current Outpatient Medications on File Prior to Visit   Medication Sig Dispense Refill    albuterol (PROVENTIL HFA,VENTOLIN HFA) 90 mcg/act inhaler Inhale 2 puffs every 4 (four) hours as needed for wheezing or shortness of breath 18 g 0    atorvastatin (LIPITOR) 20 mg tablet TAKE 1 TABLET (20 MG TOTAL) BY MOUTH DAILY 90 tablet 1    cetirizine (ZyrTEC) 10 mg tablet TAKE 1 TABLET (10 MG TOTAL) BY MOUTH IN THE MORNING. 30 tablet 2    fluticasone-salmeterol (Advair) 250-50 mcg/dose inhaler Inhale 1 puff  in the morning and 1 puff in the evening. Rinse mouth after use. . 60 blister 5    methocarbamol (ROBAXIN) 500 mg tablet Take 1 tablet (500 mg total) by mouth every 8 (eight) hours as needed for muscle spasms 20 tablet 0    tiotropium-olodaterol (Stiolto Respimat) 2.5-2.5 MCG/ACT inhaler Inhale 2 puffs daily 4 g 0    [DISCONTINUED] albuterol (2.5 mg/3 mL) 0.083 % nebulizer solution Take 3 mL (2.5 mg total) by nebulization every 6 (six) hours as needed for wheezing or shortness of breath 180 mL 3    [DISCONTINUED] levothyroxine 150 mcg tablet TAKE 1 TABLET (150 MCG TOTAL) BY MOUTH IN THE MORNING. 90 tablet 1    [DISCONTINUED] lisinopril-hydrochlorothiazide (PRINZIDE,ZESTORETIC) 10-12.5 MG per tablet TAKE 1 TABLET BY MOUTH DAILY 90 tablet 1     No current facility-administered medications on file prior to visit.      Social History     Tobacco Use    Smoking status: Every Day     Current packs/day: 1.00     Average packs/day: 1 pack/day for 52.0 years (52.0 ttl pk-yrs)     Types: Cigarettes    Smokeless tobacco: Never    Tobacco comments:     pt used to smoke 4 packs a day for 30 years   Vaping Use    Vaping status: Never Used   Substance and Sexual Activity    Alcohol use: Yes     Comment: social    Drug use: Never     Comment: Denies-Per [place]     Sexual activity: Not on file       Objective     /80 (BP Location: Left arm, Patient Position: Sitting, Cuff Size: Standard)   Pulse 56    "Temp (!) 97.3 °F (36.3 °C) (Tympanic)   Resp 16   Ht 5' 2.5\" (1.588 m)   Wt 73.5 kg (162 lb)   SpO2 99%   BMI 29.16 kg/m²     Physical Exam  Constitutional:       Appearance: Normal appearance.   HENT:      Head: Normocephalic and atraumatic.      Right Ear: External ear normal.      Left Ear: External ear normal.      Nose: Nose normal.      Mouth/Throat:      Mouth: Mucous membranes are moist.   Eyes:      Extraocular Movements: Extraocular movements intact.   Cardiovascular:      Rate and Rhythm: Normal rate and regular rhythm.      Heart sounds: Normal heart sounds.   Pulmonary:      Effort: Pulmonary effort is normal.      Breath sounds: Normal breath sounds.   Abdominal:      Palpations: Abdomen is soft.   Musculoskeletal:         General: Normal range of motion.      Cervical back: Normal range of motion and neck supple.   Skin:     General: Skin is warm.   Neurological:      General: No focal deficit present.      Mental Status: She is alert and oriented to person, place, and time.   Psychiatric:         Mood and Affect: Mood normal.         Behavior: Behavior normal.         Thought Content: Thought content normal.         Judgment: Judgment normal.       Administrative Statements   I have spent a total time of 20   minutes in caring for this patient on the day of the visit/encounter including Risks and benefits of tx options, Patient and family education, Risk factor reductions, and Documenting in the medical record.  "

## 2024-07-22 DIAGNOSIS — I10 ESSENTIAL HYPERTENSION: ICD-10-CM

## 2024-07-22 DIAGNOSIS — E78.5 HYPERLIPIDEMIA, UNSPECIFIED HYPERLIPIDEMIA TYPE: ICD-10-CM

## 2024-07-22 DIAGNOSIS — E03.9 HYPOTHYROIDISM, UNSPECIFIED TYPE: ICD-10-CM

## 2024-07-22 NOTE — TELEPHONE ENCOUNTER
[facility] never received lisinopril-hydrochlorothiazide and levothyroxine NOT A DUPLICATE on 7/15/24 the only one received was nebulizer solution please resend.Sending how  ordered  Plus needs refill on Atorvastatin   Reason for call:   [x] Refill   [] Prior Auth  [] Other:     Office:   [x] PCP/Provider - Stair  [] Specialty/Provider -     Medication: Levothyroxine   Dose/Frequency: 50 mg Daily   Quantity: 100    Medication: Lisinopril-Hydrochlorothiazide   Dose/Frequency: 10-12.5 mg Daily  Quantity: 100    Medication: Atorvastatin   Dose/Frequency: 20 mg Daily  Quantity: 100    Pharmacy: [facility] [place] [address]     Does the patient have enough for 3 days?   [] Yes   [x] No - Send as HP to POD

## 2024-07-23 RX ORDER — ATORVASTATIN CALCIUM 20 MG/1
20 TABLET, FILM COATED ORAL DAILY
Qty: 30 TABLET | Refills: 0 | Status: SHIPPED | OUTPATIENT
Start: 2024-07-23

## 2024-07-23 RX ORDER — LEVOTHYROXINE SODIUM 0.15 MG/1
150 TABLET ORAL DAILY
Qty: 90 TABLET | Refills: 3 | Status: SHIPPED | OUTPATIENT
Start: 2024-07-23

## 2024-07-23 RX ORDER — LISINOPRIL AND HYDROCHLOROTHIAZIDE 12.5; 1 MG/1; MG/1
1 TABLET ORAL DAILY
Qty: 90 TABLET | Refills: 3 | Status: SHIPPED | OUTPATIENT
Start: 2024-07-23

## 2024-08-27 ENCOUNTER — TELEPHONE (OUTPATIENT)
Age: 64
End: 2024-08-27

## 2024-08-27 DIAGNOSIS — I10 ESSENTIAL HYPERTENSION: Primary | ICD-10-CM

## 2024-08-27 DIAGNOSIS — N18.30 STAGE 3 CHRONIC KIDNEY DISEASE, UNSPECIFIED WHETHER STAGE 3A OR 3B CKD (HCC): ICD-10-CM

## 2024-08-27 DIAGNOSIS — E03.9 HYPOTHYROIDISM, UNSPECIFIED TYPE: ICD-10-CM

## 2024-08-27 NOTE — TELEPHONE ENCOUNTER
Pt states that she went to go get blood work done this morning and no orders were placed. Pt states that at her last OV in July, it was talked about retesting her kidney and thyroid. Please advise.

## 2024-10-22 ENCOUNTER — TELEPHONE (OUTPATIENT)
Age: 64
End: 2024-10-22

## 2024-10-22 ENCOUNTER — APPOINTMENT (OUTPATIENT)
Dept: LAB | Facility: CLINIC | Age: 64
End: 2024-10-22
Payer: MEDICARE

## 2024-10-22 DIAGNOSIS — E03.9 HYPOTHYROIDISM, UNSPECIFIED TYPE: ICD-10-CM

## 2024-10-22 DIAGNOSIS — I10 ESSENTIAL HYPERTENSION: ICD-10-CM

## 2024-10-22 DIAGNOSIS — N18.30 STAGE 3 CHRONIC KIDNEY DISEASE, UNSPECIFIED WHETHER STAGE 3A OR 3B CKD (HCC): ICD-10-CM

## 2024-10-22 LAB
ALBUMIN SERPL BCG-MCNC: 4 G/DL (ref 3.5–5)
ALP SERPL-CCNC: 67 U/L (ref 34–104)
ALT SERPL W P-5'-P-CCNC: 12 U/L (ref 7–52)
ANION GAP SERPL CALCULATED.3IONS-SCNC: 9 MMOL/L (ref 4–13)
AST SERPL W P-5'-P-CCNC: 12 U/L (ref 13–39)
BASOPHILS # BLD AUTO: 0.12 THOUSANDS/ΜL (ref 0–0.1)
BASOPHILS NFR BLD AUTO: 1 % (ref 0–1)
BILIRUB SERPL-MCNC: 0.31 MG/DL (ref 0.2–1)
BUN SERPL-MCNC: 21 MG/DL (ref 5–25)
CALCIUM SERPL-MCNC: 10 MG/DL (ref 8.4–10.2)
CHLORIDE SERPL-SCNC: 105 MMOL/L (ref 96–108)
CHOLEST SERPL-MCNC: 170 MG/DL
CO2 SERPL-SCNC: 25 MMOL/L (ref 21–32)
CREAT SERPL-MCNC: 1.47 MG/DL (ref 0.6–1.3)
EOSINOPHIL # BLD AUTO: 0.25 THOUSAND/ΜL (ref 0–0.61)
EOSINOPHIL NFR BLD AUTO: 3 % (ref 0–6)
ERYTHROCYTE [DISTWIDTH] IN BLOOD BY AUTOMATED COUNT: 13.2 % (ref 11.6–15.1)
GFR SERPL CREATININE-BSD FRML MDRD: 37 ML/MIN/1.73SQ M
GLUCOSE P FAST SERPL-MCNC: 91 MG/DL (ref 65–99)
HCT VFR BLD AUTO: 39.1 % (ref 34.8–46.1)
HDLC SERPL-MCNC: 48 MG/DL
HGB BLD-MCNC: 13.1 G/DL (ref 11.5–15.4)
IMM GRANULOCYTES # BLD AUTO: 0.03 THOUSAND/UL (ref 0–0.2)
IMM GRANULOCYTES NFR BLD AUTO: 0 % (ref 0–2)
LDLC SERPL CALC-MCNC: 97 MG/DL (ref 0–100)
LYMPHOCYTES # BLD AUTO: 2.79 THOUSANDS/ΜL (ref 0.6–4.47)
LYMPHOCYTES NFR BLD AUTO: 29 % (ref 14–44)
MCH RBC QN AUTO: 30.6 PG (ref 26.8–34.3)
MCHC RBC AUTO-ENTMCNC: 33.5 G/DL (ref 31.4–37.4)
MCV RBC AUTO: 91 FL (ref 82–98)
MONOCYTES # BLD AUTO: 0.6 THOUSAND/ΜL (ref 0.17–1.22)
MONOCYTES NFR BLD AUTO: 6 % (ref 4–12)
NEUTROPHILS # BLD AUTO: 5.96 THOUSANDS/ΜL (ref 1.85–7.62)
NEUTS SEG NFR BLD AUTO: 61 % (ref 43–75)
NONHDLC SERPL-MCNC: 122 MG/DL
NRBC BLD AUTO-RTO: 0 /100 WBCS
PLATELET # BLD AUTO: 342 THOUSANDS/UL (ref 149–390)
PMV BLD AUTO: 9.6 FL (ref 8.9–12.7)
POTASSIUM SERPL-SCNC: 4.9 MMOL/L (ref 3.5–5.3)
PROT SERPL-MCNC: 7 G/DL (ref 6.4–8.4)
RBC # BLD AUTO: 4.28 MILLION/UL (ref 3.81–5.12)
SODIUM SERPL-SCNC: 139 MMOL/L (ref 135–147)
TRIGL SERPL-MCNC: 124 MG/DL
TSH SERPL DL<=0.05 MIU/L-ACNC: 0.04 UIU/ML (ref 0.45–4.5)
WBC # BLD AUTO: 9.75 THOUSAND/UL (ref 4.31–10.16)

## 2024-10-22 PROCEDURE — 80061 LIPID PANEL: CPT

## 2024-10-22 PROCEDURE — 84443 ASSAY THYROID STIM HORMONE: CPT

## 2024-10-22 PROCEDURE — 36415 COLL VENOUS BLD VENIPUNCTURE: CPT

## 2024-10-22 PROCEDURE — 85025 COMPLETE CBC W/AUTO DIFF WBC: CPT

## 2024-10-22 PROCEDURE — 80053 COMPREHEN METABOLIC PANEL: CPT

## 2024-10-22 NOTE — TELEPHONE ENCOUNTER
Pt called to confirm there are lab orders in her chart so she can get them done this week. No further action needed.

## 2024-10-28 ENCOUNTER — TELEPHONE (OUTPATIENT)
Age: 64
End: 2024-10-28

## 2024-10-28 DIAGNOSIS — E03.9 HYPOTHYROIDISM, UNSPECIFIED TYPE: ICD-10-CM

## 2024-10-28 RX ORDER — LEVOTHYROXINE SODIUM 125 UG/1
125 TABLET ORAL DAILY
Qty: 30 TABLET | Refills: 6 | Status: SHIPPED | OUTPATIENT
Start: 2024-10-28 | End: 2024-11-01 | Stop reason: SDUPTHER

## 2024-10-28 NOTE — TELEPHONE ENCOUNTER
Ok, we can send the levothyroxine 125 mcg daily to [facility] in [place] and recheck the TSH in 3 months-I'd still recommend she see a Nephrologist but if she doesn't want to so be it

## 2024-10-28 NOTE — TELEPHONE ENCOUNTER
Keely's labwork looks ok, just kidney function reflective of chronic kidney disease-it might be a good idea for her to see Nephrology, unless she's already seeing tehm     Looks like Keely's thyroid dose (levothyroxine dose) is a little too high-can adjust dose down to 125 mcg daily and recheck TSH in 3 months     A. Asked questions to (patient/patient representative as listed on communication consent form) as per provider message. Patient/Patient Representative expressed understanding and had the following response: Pt is asking for the new dose of Levothyroxine to be sent to [facility] in [place]. Pt also is not interested in seeing Nephrology. She does not have a nephrologist now but has seen one in the past and they did not do anything. They just talked.   Please advise.    B. Route to OFFICE CLINICAL POOL for follow-up.

## 2024-10-29 ENCOUNTER — TELEPHONE (OUTPATIENT)
Age: 64
End: 2024-10-29

## 2024-10-29 NOTE — TELEPHONE ENCOUNTER
PA for levothyroxine 125 mcg tablet  NOT REQUIRED          Pharmacy advised by    []Fax  [x]Phone call    Spoke with pharmacy staff member Angelica, [facility] received paid claim, copay $1, pharmacy will notify patient when available for pick-up.

## 2024-10-30 DIAGNOSIS — E03.9 HYPOTHYROIDISM, UNSPECIFIED TYPE: ICD-10-CM

## 2024-10-30 RX ORDER — LEVOTHYROXINE SODIUM 125 UG/1
125 TABLET ORAL DAILY
Qty: 30 TABLET | Refills: 6 | Status: CANCELLED | OUTPATIENT
Start: 2024-10-30

## 2024-11-01 DIAGNOSIS — E03.9 HYPOTHYROIDISM, UNSPECIFIED TYPE: ICD-10-CM

## 2024-11-01 DIAGNOSIS — N18.30 STAGE 3 CHRONIC KIDNEY DISEASE, UNSPECIFIED WHETHER STAGE 3A OR 3B CKD (HCC): Primary | ICD-10-CM

## 2024-11-01 RX ORDER — LEVOTHYROXINE SODIUM 125 UG/1
125 TABLET ORAL DAILY
Qty: 30 TABLET | Refills: 6 | Status: SHIPPED | OUTPATIENT
Start: 2024-11-01

## 2025-01-27 ENCOUNTER — TELEPHONE (OUTPATIENT)
Dept: NEPHROLOGY | Facility: CLINIC | Age: 65
End: 2025-01-27

## 2025-01-27 NOTE — TELEPHONE ENCOUNTER
CALLED REGARDING NO INSURANCE FOR 2/4 CONSULT.   She said she is having problems and has no insurance now and needed to cancel her appointment and will follow up when ready.

## 2025-04-09 ENCOUNTER — NURSE TRIAGE (OUTPATIENT)
Age: 65
End: 2025-04-09

## 2025-04-09 ENCOUNTER — OFFICE VISIT (OUTPATIENT)
Dept: URGENT CARE | Facility: CLINIC | Age: 65
End: 2025-04-09
Payer: COMMERCIAL

## 2025-04-09 VITALS
OXYGEN SATURATION: 99 % | DIASTOLIC BLOOD PRESSURE: 82 MMHG | TEMPERATURE: 97.5 F | WEIGHT: 175.8 LBS | SYSTOLIC BLOOD PRESSURE: 128 MMHG | HEART RATE: 64 BPM | BODY MASS INDEX: 31.64 KG/M2 | RESPIRATION RATE: 18 BRPM

## 2025-04-09 DIAGNOSIS — B96.89 ACUTE BACTERIAL BRONCHITIS: Primary | ICD-10-CM

## 2025-04-09 DIAGNOSIS — J20.8 ACUTE BACTERIAL BRONCHITIS: Primary | ICD-10-CM

## 2025-04-09 PROCEDURE — 99203 OFFICE O/P NEW LOW 30 MIN: CPT | Performed by: PHYSICIAN ASSISTANT

## 2025-04-09 RX ORDER — AZITHROMYCIN 250 MG/1
TABLET, FILM COATED ORAL
Qty: 6 TABLET | Refills: 0 | Status: SHIPPED | OUTPATIENT
Start: 2025-04-09 | End: 2025-04-13

## 2025-04-09 RX ORDER — BENZONATATE 200 MG/1
200 CAPSULE ORAL
Qty: 20 CAPSULE | Refills: 0 | Status: SHIPPED | OUTPATIENT
Start: 2025-04-09

## 2025-04-09 RX ORDER — ALBUTEROL SULFATE 90 UG/1
2 INHALANT RESPIRATORY (INHALATION) EVERY 6 HOURS PRN
Qty: 8.5 G | Refills: 0 | Status: SHIPPED | OUTPATIENT
Start: 2025-04-09

## 2025-04-09 NOTE — PROGRESS NOTES
[facility]      NAME: Keely Padgett is a 65 y.o. female  : 1960    MRN: [id number]  DATE: 2025  TIME: 4:24 PM    Assessment and Plan   Acute bacterial bronchitis [J20.8, B96.89]  1. Acute bacterial bronchitis  azithromycin (ZITHROMAX) 250 mg tablet    albuterol (ProAir HFA) 90 mcg/act inhaler    benzonatate (TESSALON) 200 MG capsule          Patient Instructions   I have prescribed an antibiotic for the infection.  Please take the antibiotic as prescribed and finish the entire prescription.  I recommend that the patient takes an over the counter probiotic or eats yogurt with live cultures in it (activia) to keep good bacteria in the gut and help prevent diarrhea.  Wash hands frequently to prevent the spread of infection.  Can use over the counter cough and cold medications to help with symptoms.  Ibuprofen and/or tylenol as needed for pain or fever.  If not improving over the next 3-5 days, follow up with PCP.    Risks and benefits discussed. Patient understands and agrees with the plan.      If tests have been performed at [facility], our office will contact you with results if changes need to be made to the care plan discussed with you at the visit.  You can review your full results on [facility] MyChart.     Follow up with PCP in 3-5 days.      If any of the following occur, please report to your nearest ED for evaluation or call 911.   Difficultly breathing or shortness of breath  Chest pain  Acutely worsening symptoms.   To present to the ER if symptoms worsen.  Chief Complaint     Chief Complaint   Patient presents with    Sinus Problem     Started Thursday with sore throat, bilateral ear pain, cough, SOB, loss of voice, fever tmax: 101, she's been managing with Sudafed and Robitussin          History of Present Illness   Keely Padgett presents to the clinic with  c/o    Sinus Problem  This is a new problem. The problem has been gradually worsening since onset. The maximum  temperature recorded prior to her arrival was 101 - 101.9 F. The fever has been present for Less than 1 day. Associated symptoms include congestion, coughing, ear pain, a hoarse voice, shortness of breath, sinus pressure and a sore throat. Pertinent negatives include no chills, diaphoresis or headaches. Treatments tried: robitussin, cough drops. The treatment provided no relief.       Review of Systems   Review of Systems   Constitutional:  Negative for chills, diaphoresis, fatigue and fever.   HENT:  Positive for congestion, ear pain, hoarse voice, postnasal drip, sinus pressure, sinus pain, sore throat and voice change. Negative for ear discharge and facial swelling.    Eyes:  Negative for photophobia, pain, discharge, redness, itching and visual disturbance.   Respiratory:  Positive for cough, shortness of breath and wheezing. Negative for apnea and chest tightness.    Cardiovascular:  Negative for chest pain and palpitations.   Gastrointestinal:  Negative for abdominal pain.   Skin:  Negative for color change, rash and wound.   Neurological:  Negative for dizziness and headaches.   Hematological:  Negative for adenopathy.         Current Medications     Long-Term Medications   Medication Sig Dispense Refill    atorvastatin (LIPITOR) 20 mg tablet Take 1 tablet (20 mg total) by mouth daily 30 tablet 0    cetirizine (ZyrTEC) 10 mg tablet TAKE 1 TABLET (10 MG TOTAL) BY MOUTH IN THE MORNING. 30 tablet 2    fluticasone-salmeterol (Advair) 250-50 mcg/dose inhaler Inhale 1 puff  in the morning and 1 puff in the evening. Rinse mouth after use. . 60 blister 5    levothyroxine 125 mcg tablet Take 1 tablet (125 mcg total) by mouth daily 30 tablet 6    lisinopril-hydrochlorothiazide (PRINZIDE,ZESTORETIC) 10-12.5 MG per tablet Take 1 tablet by mouth daily 90 tablet 3    methocarbamol (ROBAXIN) 500 mg tablet Take 1 tablet (500 mg total) by mouth every 8 (eight) hours as needed for muscle spasms 20 tablet 0     tiotropium-olodaterol (Stiolto Respimat) 2.5-2.5 MCG/ACT inhaler Inhale 2 puffs daily 4 g 0       Current Allergies     Allergies as of 2025 - Reviewed 2025   Allergen Reaction Noted    Clindamycin Hives 2021    Sulfa antibiotics Itching 2021            The following portions of the patient's history were reviewed and updated as appropriate: allergies, current medications, past family history, past medical history, past social history, past surgical history and problem list.  Past Medical History:   Diagnosis Date    Allergic     Allergy to sulfa drugs     Anxiety     Chronic back pain     COPD (chronic obstructive pulmonary disease) (HCC)     Depression     Enlarged heart     GERD (gastroesophageal reflux disease)     Headache     Heart disease     Heart murmur     Heart valve disorder     HTN (hypertension)     Hyperglycemia     Hyperlipidemia     Hypothyroidism     Insomnia     Irritable bowel syndrome with diarrhea     Kidney stones     Migraines     MVP (mitral valve prolapse)     Myxomatous mitral valve     mild lvh ef 60%    Seasonal allergies     Sickle cell trait (HCC)     Stage 3b chronic kidney disease (HCC)     Thalassemia carrier     Tobacco user     Urinary incontinence     Vitamin D deficiency      Past Surgical History:   Procedure Laterality Date    ALVEOPLASTY  09/15/2016    with extraction     SECTION      CHOLECYSTECTOMY      TONSILLECTOMY      age 7     Social History     Socioeconomic History    Marital status: Legally      Spouse name: Not on file    Number of children: Not on file    Years of education: Not on file    Highest education level: 12th grade   Occupational History    Not on file   Tobacco Use    Smoking status: Every Day     Current packs/day: 1.00     Average packs/day: 1 pack/day for 52.0 years (52.0 ttl pk-yrs)     Types: Cigarettes    Smokeless tobacco: Never    Tobacco comments:     pt used to smoke 4 packs a day for 30 years    Vaping Use    Vaping status: Never Used   Substance and Sexual Activity    Alcohol use: Yes     Comment: social    Drug use: Never     Comment: Denies-Per [place]     Sexual activity: Not on file   Other Topics Concern    Not on file   Social History Narrative    Most recent tobacco use screenin2019      Do you currently or have you served in the [employer] Forces:   No      Were you activated, into active duty, as a member of the [employer] or as a Reservist:   No      Exercise level:   None      Diet:   Regular      Has smoked since age:   18      Caffeine intake:   Heavy      Guns present in home:   No      Seat belts used routinely:   Yes      Sunscreen used routinely:   No      Smoke alarm in home:   Yes      Advance directive:   No      Live alone or with others:   alone      Are there stairs in your home:   Yes      International travel:   denies      Pets:   No      Deaf or serious difficulty hearing:   No      Blind or serious difficulty seeing:   No      Difficulty concentrating, remembering or making decisions:   Yes      Difficulty walking or climbing stairs:   No      Difficulty dressing or bathing:   No      Difficulty doing errands alone:   No      Social Drivers of Health     Financial Resource Strain: Not on file   Food Insecurity: Not on file   Transportation Needs: Not on file   Physical Activity: Not on file   Stress: Not on file   Social Connections: Not on file   Intimate Partner Violence: Not on file   Housing Stability: Not on file       Objective   /82 (BP Location: Left arm, Patient Position: Sitting, Cuff Size: Large)   Pulse 64   Temp 97.5 °F (36.4 °C) (Tympanic)   Resp 18   Wt 79.7 kg (175 lb 12.8 oz)   SpO2 99%   BMI 31.64 kg/m²      Physical Exam     Physical Exam  Vitals and nursing note reviewed.   Constitutional:       General: She is not in acute distress.     Appearance: She is well-developed. She is not diaphoretic.   HENT:      Head: Normocephalic and  atraumatic.      Right Ear: Tympanic membrane and external ear normal.      Left Ear: Tympanic membrane and external ear normal.      Nose: Nose normal.      Mouth/Throat:      Mouth: Mucous membranes are moist.      Pharynx: No oropharyngeal exudate or posterior oropharyngeal erythema.   Eyes:      General: No scleral icterus.        Right eye: No discharge.         Left eye: No discharge.      Conjunctiva/sclera: Conjunctivae normal.   Cardiovascular:      Rate and Rhythm: Normal rate and regular rhythm.      Heart sounds: Normal heart sounds. No murmur heard.     No friction rub. No gallop.   Pulmonary:      Effort: Pulmonary effort is normal. No respiratory distress.      Breath sounds: Wheezing (diffuse expiratory) present. No decreased breath sounds, rhonchi or rales.   Skin:     General: Skin is warm and dry.      Coloration: Skin is not pale.      Findings: No erythema or rash.   Neurological:      Mental Status: She is alert and oriented to person, place, and time.   Psychiatric:         Behavior: Behavior normal.         Thought Content: Thought content normal.         Judgment: Judgment normal.         Dania Hinojosa PA-C

## 2025-04-09 NOTE — TELEPHONE ENCOUNTER
"  FOLLOW UP: No OV available today. Encouraged patient to go to  for evaluation.    REASON FOR CONVERSATION: URI    SYMPTOMS: Started over a week ago. Patient reports she is congested with a yellow green sputum. Has head pressure/pain and right ear pain. Also has a moderate to severe cough that causes patient some SOB and wheezing heard while on phone. Patient has history of COPD and asthma. Has been using her inhaler and nebulizer. Encouraged patient to go to the  for evaluation ASAP. Patient in agreement and will go.    OTHER: Patient will go to .     DISPOSITION: See Today in Office  Reason for Disposition   Sinus pain (not just congestion) and fever   Earache    Answer Assessment - Initial Assessment Questions  1. LOCATION: \"Where does it hurt?\"       Head and right ear pain   2. ONSET: \"When did the sinus pain start?\"  (e.g., hours, days)       About a week ago  3. SEVERITY: \"How bad is the pain?\"   (Scale 0-10; or none, mild, moderate or severe)     10/10  4. RECURRENT SYMPTOM: \"Have you ever had sinus problems before?\" If Yes, ask: \"When was the last time?\" and \"What happened that time?\"       yes  5. NASAL CONGESTION: \"Is the nose blocked?\" If Yes, ask: \"Can you open it or must you breathe through your mouth?\"      yes  6. NASAL DISCHARGE: \"Do you have discharge from your nose?\" If so ask, \"What color?\"      Yellow/green color   7. FEVER: \"Do you have a fever?\" If Yes, ask: \"What is it, how was it measured, and when did it start?\"       Yes   8. OTHER SYMPTOMS: \"Do you have any other symptoms?\" (e.g., sore throat, cough, earache, difficulty breathing)      Sore throat, hard to talk, SOB hx COPD and asthma, using nebulizer , cough, wheezing   9. PREGNANCY: \"Is there any chance you are pregnant?\" \"When was your last menstrual period?\"      NA    Protocols used: Sinus Pain or Congestion-Adult-[place]    "

## 2025-04-21 DIAGNOSIS — I10 ESSENTIAL HYPERTENSION: ICD-10-CM

## 2025-04-21 DIAGNOSIS — E78.5 HYPERLIPIDEMIA, UNSPECIFIED HYPERLIPIDEMIA TYPE: ICD-10-CM

## 2025-04-21 RX ORDER — LISINOPRIL AND HYDROCHLOROTHIAZIDE 10; 12.5 MG/1; MG/1
1 TABLET ORAL DAILY
Qty: 90 TABLET | Refills: 0 | Status: SHIPPED | OUTPATIENT
Start: 2025-04-21

## 2025-04-21 RX ORDER — ATORVASTATIN CALCIUM 20 MG/1
20 TABLET, FILM COATED ORAL DAILY
Qty: 90 TABLET | Refills: 0 | Status: SHIPPED | OUTPATIENT
Start: 2025-04-21

## 2025-04-21 NOTE — TELEPHONE ENCOUNTER
Reason for call:   [x] Refill   [] Prior Auth  [] Other:     Office:   [x] PCP/Provider -   [] Specialty/Provider -     Medication: atorvastatin 20 mg, take 1 tablet by mouth daily                        Lisinopril-hydrochlorothiazide 10-12.5 mg, take 1 tablet by mouth daily       Pharmacy: [facility] [place]     Local Pharmacy   Does the patient have enough for 3 days?   [] Yes   [x] No - Send as HP to POD    Mail Away Pharmacy   Does the patient have enough for 10 days?   [] Yes   [] No - Send as HP to POD

## 2025-06-21 ENCOUNTER — TELEPHONE (OUTPATIENT)
Dept: OTHER | Facility: OTHER | Age: 65
End: 2025-06-21

## 2025-06-21 NOTE — TELEPHONE ENCOUNTER
Patient is calling regarding cancelling an appointment.    Date/Time: 6/23/25 at 1:15 p    Patient was rescheduled: YES [] NO [x]    Patient requesting call back to reschedule: YES [x] NO []

## 2025-07-30 ENCOUNTER — OFFICE VISIT (OUTPATIENT)
Dept: FAMILY MEDICINE CLINIC | Facility: CLINIC | Age: 65
End: 2025-07-30
Payer: MEDICARE

## 2025-07-30 ENCOUNTER — APPOINTMENT (OUTPATIENT)
Dept: LAB | Facility: HOSPITAL | Age: 65
End: 2025-07-30
Attending: FAMILY MEDICINE
Payer: MEDICARE

## 2025-07-30 VITALS
BODY MASS INDEX: 29.88 KG/M2 | HEIGHT: 63 IN | DIASTOLIC BLOOD PRESSURE: 62 MMHG | SYSTOLIC BLOOD PRESSURE: 110 MMHG | RESPIRATION RATE: 18 BRPM | WEIGHT: 168.6 LBS | TEMPERATURE: 96.7 F | OXYGEN SATURATION: 98 % | HEART RATE: 62 BPM

## 2025-07-30 DIAGNOSIS — E03.9 HYPOTHYROIDISM, UNSPECIFIED TYPE: ICD-10-CM
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
## 2025-07-30 DIAGNOSIS — A69.20 ERYTHEMA MIGRANS (LYME DISEASE): Primary | ICD-10-CM

## 2025-07-30 DIAGNOSIS — S80.862A TICK BITE OF LEFT CALF, INITIAL ENCOUNTER: ICD-10-CM

## 2025-07-30 DIAGNOSIS — A69.20 ERYTHEMA MIGRANS (LYME DISEASE): ICD-10-CM

## 2025-07-30 LAB — TSH SERPL DL<=0.05 MIU/L-ACNC: 4.54 UIU/ML (ref 0.45–4.5)

## 2025-07-30 PROCEDURE — 86618 LYME DISEASE ANTIBODY: CPT

## 2025-07-30 PROCEDURE — 36415 COLL VENOUS BLD VENIPUNCTURE: CPT

## 2025-07-30 PROCEDURE — 84439 ASSAY OF FREE THYROXINE: CPT

## 2025-07-30 PROCEDURE — 99214 OFFICE O/P EST MOD 30 MIN: CPT | Performed by: FAMILY MEDICINE

## 2025-07-30 PROCEDURE — 84443 ASSAY THYROID STIM HORMONE: CPT

## 2025-07-30 RX ORDER — DOXYCYCLINE 100 MG/1
100 CAPSULE ORAL EVERY 12 HOURS SCHEDULED
Qty: 28 CAPSULE | Refills: 0 | Status: SHIPPED | OUTPATIENT
Start: 2025-07-30 | End: 2025-08-13

## 2025-07-31 LAB
B BURGDOR IGG+IGM SER QL IA: NEGATIVE
T4 FREE SERPL-MCNC: 0.86 NG/DL (ref 0.61–1.12)

## 2025-08-03 DIAGNOSIS — E03.9 HYPOTHYROIDISM, UNSPECIFIED TYPE: ICD-10-CM

## 2025-08-03 RX ORDER — LEVOTHYROXINE SODIUM 150 UG/1
150 TABLET ORAL DAILY
Qty: 90 TABLET | Refills: 3 | Status: SHIPPED | OUTPATIENT
Start: 2025-08-03